# Patient Record
Sex: MALE | Race: WHITE | NOT HISPANIC OR LATINO | URBAN - METROPOLITAN AREA
[De-identification: names, ages, dates, MRNs, and addresses within clinical notes are randomized per-mention and may not be internally consistent; named-entity substitution may affect disease eponyms.]

---

## 2017-05-11 ENCOUNTER — OUTPATIENT (OUTPATIENT)
Dept: OUTPATIENT SERVICES | Facility: HOSPITAL | Age: 82
LOS: 1 days | Discharge: HOME | End: 2017-05-11

## 2017-06-06 ENCOUNTER — INPATIENT (INPATIENT)
Facility: HOSPITAL | Age: 82
LOS: 2 days | Discharge: HOME | End: 2017-06-09
Attending: INTERNAL MEDICINE

## 2017-06-06 DIAGNOSIS — I10 ESSENTIAL (PRIMARY) HYPERTENSION: ICD-10-CM

## 2017-06-06 DIAGNOSIS — I73.9 PERIPHERAL VASCULAR DISEASE, UNSPECIFIED: ICD-10-CM

## 2017-06-06 DIAGNOSIS — J44.1 CHRONIC OBSTRUCTIVE PULMONARY DISEASE WITH (ACUTE) EXACERBATION: ICD-10-CM

## 2017-06-06 DIAGNOSIS — J44.0 CHRONIC OBSTRUCTIVE PULMONARY DISEASE WITH (ACUTE) LOWER RESPIRATORY INFECTION: ICD-10-CM

## 2017-06-06 DIAGNOSIS — E78.5 HYPERLIPIDEMIA, UNSPECIFIED: ICD-10-CM

## 2017-06-06 DIAGNOSIS — I71.4 ABDOMINAL AORTIC ANEURYSM, WITHOUT RUPTURE: ICD-10-CM

## 2017-06-23 PROBLEM — Z00.00 ENCOUNTER FOR PREVENTIVE HEALTH EXAMINATION: Status: ACTIVE | Noted: 2017-06-23

## 2017-06-28 DIAGNOSIS — J44.1 CHRONIC OBSTRUCTIVE PULMONARY DISEASE WITH (ACUTE) EXACERBATION: ICD-10-CM

## 2017-06-28 DIAGNOSIS — I10 ESSENTIAL (PRIMARY) HYPERTENSION: ICD-10-CM

## 2017-06-28 DIAGNOSIS — I73.9 PERIPHERAL VASCULAR DISEASE, UNSPECIFIED: ICD-10-CM

## 2017-06-28 DIAGNOSIS — F17.210 NICOTINE DEPENDENCE, CIGARETTES, UNCOMPLICATED: ICD-10-CM

## 2017-06-28 DIAGNOSIS — Z95.2 PRESENCE OF PROSTHETIC HEART VALVE: ICD-10-CM

## 2017-06-28 DIAGNOSIS — E78.00 PURE HYPERCHOLESTEROLEMIA, UNSPECIFIED: ICD-10-CM

## 2017-06-28 DIAGNOSIS — H54.42 BLINDNESS, LEFT EYE, NORMAL VISION RIGHT EYE: ICD-10-CM

## 2017-06-28 DIAGNOSIS — J96.22 ACUTE AND CHRONIC RESPIRATORY FAILURE WITH HYPERCAPNIA: ICD-10-CM

## 2017-07-18 ENCOUNTER — APPOINTMENT (OUTPATIENT)
Dept: VASCULAR SURGERY | Facility: CLINIC | Age: 82
End: 2017-07-18

## 2017-07-18 VITALS
BODY MASS INDEX: 25.66 KG/M2 | WEIGHT: 154 LBS | SYSTOLIC BLOOD PRESSURE: 124 MMHG | DIASTOLIC BLOOD PRESSURE: 78 MMHG | HEIGHT: 65 IN

## 2017-07-18 DIAGNOSIS — I73.9 PERIPHERAL VASCULAR DISEASE, UNSPECIFIED: ICD-10-CM

## 2017-07-18 DIAGNOSIS — J44.9 CHRONIC OBSTRUCTIVE PULMONARY DISEASE, UNSPECIFIED: ICD-10-CM

## 2017-07-18 DIAGNOSIS — Z78.9 OTHER SPECIFIED HEALTH STATUS: ICD-10-CM

## 2017-07-18 DIAGNOSIS — F17.200 NICOTINE DEPENDENCE, UNSPECIFIED, UNCOMPLICATED: ICD-10-CM

## 2017-07-18 DIAGNOSIS — Z86.79 PERSONAL HISTORY OF OTHER DISEASES OF THE CIRCULATORY SYSTEM: ICD-10-CM

## 2017-07-18 DIAGNOSIS — H54.42 BLINDNESS, LEFT EYE, NORMAL VISION RIGHT EYE: ICD-10-CM

## 2017-07-18 DIAGNOSIS — Z86.39 PERSONAL HISTORY OF OTHER ENDOCRINE, NUTRITIONAL AND METABOLIC DISEASE: ICD-10-CM

## 2017-07-18 RX ORDER — OLOPATADINE HYDROCHLORIDE 2 MG/ML
0.2 SOLUTION OPHTHALMIC
Refills: 0 | Status: ACTIVE | COMMUNITY

## 2017-07-18 RX ORDER — ATORVASTATIN CALCIUM 10 MG/1
10 TABLET, FILM COATED ORAL
Refills: 0 | Status: ACTIVE | COMMUNITY

## 2017-07-18 RX ORDER — HOMATROPINE HYDROBROMIDE OPHTHALMIC 50 MG/ML
5 SOLUTION OPHTHALMIC
Refills: 0 | Status: ACTIVE | COMMUNITY

## 2017-07-18 RX ORDER — BUDESONIDE 0.5 MG/2ML
0.5 INHALANT ORAL
Refills: 0 | Status: ACTIVE | COMMUNITY

## 2017-07-18 RX ORDER — STANDARDIZED SENNA CONCENTRATE 8.6 MG/1
TABLET ORAL
Refills: 0 | Status: ACTIVE | COMMUNITY

## 2017-07-18 RX ORDER — SOLIFENACIN SUCCINATE 5 MG/1
5 TABLET, FILM COATED ORAL
Refills: 0 | Status: ACTIVE | COMMUNITY

## 2017-07-18 RX ORDER — ESCITALOPRAM OXALATE 20 MG/1
20 TABLET ORAL
Refills: 0 | Status: ACTIVE | COMMUNITY

## 2017-07-18 RX ORDER — CHOLECALCIFEROL (VITAMIN D3) 125 MCG
TABLET ORAL
Refills: 0 | Status: ACTIVE | COMMUNITY

## 2017-07-18 RX ORDER — CLOPIDOGREL BISULFATE 75 MG/1
75 TABLET, FILM COATED ORAL
Refills: 0 | Status: ACTIVE | COMMUNITY

## 2017-07-18 RX ORDER — TAMSULOSIN HYDROCHLORIDE 0.4 MG/1
0.4 CAPSULE ORAL
Refills: 0 | Status: ACTIVE | COMMUNITY

## 2017-07-18 RX ORDER — FUROSEMIDE 20 MG/1
20 TABLET ORAL
Refills: 0 | Status: ACTIVE | COMMUNITY

## 2017-07-18 RX ORDER — DIFLUPREDNATE 0.5 MG/ML
0.05 EMULSION OPHTHALMIC
Refills: 0 | Status: ACTIVE | COMMUNITY

## 2017-07-18 RX ORDER — ALBUTEROL SULFATE 1.25 MG/3ML
1.25 SOLUTION RESPIRATORY (INHALATION)
Refills: 0 | Status: ACTIVE | COMMUNITY

## 2017-07-18 RX ORDER — QUETIAPINE FUMARATE 25 MG/1
25 TABLET ORAL
Refills: 0 | Status: ACTIVE | COMMUNITY

## 2017-10-26 ENCOUNTER — APPOINTMENT (OUTPATIENT)
Dept: ORTHOPEDIC SURGERY | Facility: CLINIC | Age: 82
End: 2017-10-26
Payer: MEDICARE

## 2017-10-26 VITALS — RESPIRATION RATE: 16 BRPM | BODY MASS INDEX: 23.64 KG/M2 | HEIGHT: 68 IN | WEIGHT: 156 LBS

## 2017-10-26 DIAGNOSIS — M18.12 UNILATERAL PRIMARY OSTEOARTHRITIS OF FIRST CARPOMETACARPAL JOINT, LEFT HAND: ICD-10-CM

## 2017-10-26 DIAGNOSIS — M65.332 TRIGGER FINGER, LEFT MIDDLE FINGER: ICD-10-CM

## 2017-10-26 DIAGNOSIS — M65.322 TRIGGER FINGER, LEFT INDEX FINGER: ICD-10-CM

## 2017-10-26 DIAGNOSIS — M65.312 TRIGGER THUMB, LEFT THUMB: ICD-10-CM

## 2017-10-26 DIAGNOSIS — M18.11 UNILATERAL PRIMARY OSTEOARTHRITIS OF FIRST CARPOMETACARPAL JOINT, RIGHT HAND: ICD-10-CM

## 2017-10-26 PROCEDURE — 20550 NJX 1 TENDON SHEATH/LIGAMENT: CPT | Mod: 59,LT

## 2017-10-26 PROCEDURE — 73110 X-RAY EXAM OF WRIST: CPT | Mod: 50

## 2017-10-26 PROCEDURE — 99203 OFFICE O/P NEW LOW 30 MIN: CPT | Mod: 25

## 2017-10-26 PROCEDURE — 20605 DRAIN/INJ JOINT/BURSA W/O US: CPT | Mod: 59,RT

## 2018-08-30 ENCOUNTER — OUTPATIENT (OUTPATIENT)
Dept: OUTPATIENT SERVICES | Facility: HOSPITAL | Age: 83
LOS: 1 days | Discharge: HOME | End: 2018-08-30

## 2018-08-30 DIAGNOSIS — R91.1 SOLITARY PULMONARY NODULE: ICD-10-CM

## 2018-08-30 LAB — GLUCOSE BLDC GLUCOMTR-MCNC: 135 MG/DL — HIGH (ref 70–99)

## 2018-12-06 ENCOUNTER — OUTPATIENT (OUTPATIENT)
Dept: OUTPATIENT SERVICES | Facility: HOSPITAL | Age: 83
LOS: 1 days | Discharge: HOME | End: 2018-12-06

## 2018-12-06 DIAGNOSIS — C34.12 MALIGNANT NEOPLASM OF UPPER LOBE, LEFT BRONCHUS OR LUNG: ICD-10-CM

## 2018-12-13 ENCOUNTER — EMERGENCY (EMERGENCY)
Facility: HOSPITAL | Age: 83
LOS: 0 days | Discharge: HOME | End: 2018-12-13
Attending: EMERGENCY MEDICINE | Admitting: EMERGENCY MEDICINE
Payer: MEDICARE

## 2018-12-13 VITALS
WEIGHT: 149.91 LBS | SYSTOLIC BLOOD PRESSURE: 161 MMHG | RESPIRATION RATE: 18 BRPM | TEMPERATURE: 97 F | HEART RATE: 63 BPM | HEIGHT: 67 IN | DIASTOLIC BLOOD PRESSURE: 68 MMHG | OXYGEN SATURATION: 96 %

## 2018-12-13 VITALS
DIASTOLIC BLOOD PRESSURE: 81 MMHG | HEART RATE: 65 BPM | SYSTOLIC BLOOD PRESSURE: 135 MMHG | OXYGEN SATURATION: 97 % | TEMPERATURE: 97 F | RESPIRATION RATE: 18 BRPM

## 2018-12-13 DIAGNOSIS — J44.9 CHRONIC OBSTRUCTIVE PULMONARY DISEASE, UNSPECIFIED: ICD-10-CM

## 2018-12-13 DIAGNOSIS — M25.472 EFFUSION, LEFT ANKLE: ICD-10-CM

## 2018-12-13 DIAGNOSIS — M79.662 PAIN IN LEFT LOWER LEG: ICD-10-CM

## 2018-12-13 DIAGNOSIS — M79.669 PAIN IN UNSPECIFIED LOWER LEG: ICD-10-CM

## 2018-12-13 DIAGNOSIS — I10 ESSENTIAL (PRIMARY) HYPERTENSION: ICD-10-CM

## 2018-12-13 DIAGNOSIS — F17.200 NICOTINE DEPENDENCE, UNSPECIFIED, UNCOMPLICATED: ICD-10-CM

## 2018-12-13 DIAGNOSIS — M25.572 PAIN IN LEFT ANKLE AND JOINTS OF LEFT FOOT: ICD-10-CM

## 2018-12-13 DIAGNOSIS — Z85.118 PERSONAL HISTORY OF OTHER MALIGNANT NEOPLASM OF BRONCHUS AND LUNG: ICD-10-CM

## 2018-12-13 PROCEDURE — 93970 EXTREMITY STUDY: CPT | Mod: 26

## 2018-12-13 NOTE — ED PROVIDER NOTE - OBJECTIVE STATEMENT
87 year old male with hx of Lung ca just finished radiation therapy 1 week ago, COPD, HTN, AVR not on AC sent in  by pulmonologist Dr. Aponte to rule out DVT. Pt has noticed worsening leg swelling over his left ankle for the last few days. + current smoker. Denies hx of DVT, recent sx, travel, chest pain, sob, trauma, hx of CHF.

## 2018-12-13 NOTE — ED PROVIDER NOTE - ATTENDING CONTRIBUTION TO CARE
Pt presenting with unilateral pedal edema and pain, no cellulitis no calf pain, no respiratory symptoms, no chest pain.   R/O DVT, no evidence of arterial insufficiency 1) check lower extremity US to eval for possible DVT; d/w pt that repeat US will be needed in one week if US is normal here 2) Reassess, pain control if necessary

## 2018-12-13 NOTE — ED ADULT NURSE NOTE - NSIMPLEMENTINTERV_GEN_ALL_ED
Implemented All Universal Safety Interventions:  [place] to call system. Call bell, personal items and telephone within reach. Instruct patient to call for assistance. Room bathroom lighting operational. Non-slip footwear when patient is off stretcher. Physically safe environment: no spills, clutter or unnecessary equipment. Stretcher in lowest position, wheels locked, appropriate side rails in place.

## 2018-12-13 NOTE — ED PROVIDER NOTE - NS ED ROS FT
Constitutional: no fever, chills, no recent weight loss, change in appetite or malaise  Cardiac: No chest pain, SOB or edema.  Respiratory: No cough or respiratory distress  GI: No nausea, vomiting, diarrhea or abdominal pain.  : No dysuria, frequency, urgency or hematuria  MS: no pain to back or extremities, no loss of ROM, no weakness  Extrem: + left ankle edema  Neuro: No headache or weakness. No LOC.  Skin: No skin rash/skin changes  Except as documented in the HPI, all other systems are negative.

## 2018-12-13 NOTE — ED PROVIDER NOTE - PHYSICAL EXAMINATION
CONSTITUTIONAL: Well-appearing; well-nourished; in no apparent distress.   NECK: Supple; non-tender; no cervical lymphadenopathy. No JVD.   CARDIOVASCULAR: Normal S1, S2; no murmurs, rubs, or gallops.   RESPIRATORY: Normal chest excursion with respiration; breath sounds clear and equal bilaterally; no wheezes, rhonchi, or rales.  GI/: Normal bowel sounds; non-distended; non-tender; no palpable organomegaly.   MS: No evidence of trauma or deformity. Non-tender to palpation. Normal ROM in all four extremities; non-tender to palpation  Extrem: + non pitting edema over left ankle. + pedal pulses intact. No calf tenderness  SKIN: Normal for age and race; No skin changes, erythema, warmth  NEURO/PSYCH: A & O x 4; grossly unremarkable. mood and manner are appropriate.

## 2019-04-11 ENCOUNTER — INPATIENT (INPATIENT)
Facility: HOSPITAL | Age: 84
LOS: 3 days | Discharge: NURSING, REHAB OR LONG-TERM CARE | End: 2019-04-15
Attending: INTERNAL MEDICINE | Admitting: INTERNAL MEDICINE
Payer: MEDICARE

## 2019-04-11 VITALS
OXYGEN SATURATION: 94 % | SYSTOLIC BLOOD PRESSURE: 156 MMHG | RESPIRATION RATE: 21 BRPM | TEMPERATURE: 98 F | DIASTOLIC BLOOD PRESSURE: 65 MMHG | HEART RATE: 78 BPM

## 2019-04-11 DIAGNOSIS — E46 UNSPECIFIED PROTEIN-CALORIE MALNUTRITION: ICD-10-CM

## 2019-04-11 DIAGNOSIS — R29.6 REPEATED FALLS: ICD-10-CM

## 2019-04-11 DIAGNOSIS — C34.90 MALIGNANT NEOPLASM OF UNSPECIFIED PART OF UNSPECIFIED BRONCHUS OR LUNG: ICD-10-CM

## 2019-04-11 DIAGNOSIS — J44.9 CHRONIC OBSTRUCTIVE PULMONARY DISEASE, UNSPECIFIED: ICD-10-CM

## 2019-04-11 DIAGNOSIS — R42 DIZZINESS AND GIDDINESS: ICD-10-CM

## 2019-04-11 DIAGNOSIS — Z79.899 OTHER LONG TERM (CURRENT) DRUG THERAPY: ICD-10-CM

## 2019-04-11 DIAGNOSIS — S20.229A CONTUSION OF UNSPECIFIED BACK WALL OF THORAX, INITIAL ENCOUNTER: ICD-10-CM

## 2019-04-11 LAB
ALBUMIN SERPL ELPH-MCNC: 3.4 G/DL — LOW (ref 3.5–5.2)
ALLERGY+IMMUNOLOGY DIAG STUDY NOTE: SIGNIFICANT CHANGE UP
ALP SERPL-CCNC: 85 U/L — SIGNIFICANT CHANGE UP (ref 30–115)
ALT FLD-CCNC: 12 U/L — SIGNIFICANT CHANGE UP (ref 0–41)
ANION GAP SERPL CALC-SCNC: 9 MMOL/L — SIGNIFICANT CHANGE UP (ref 7–14)
APPEARANCE UR: CLEAR — SIGNIFICANT CHANGE UP
APTT BLD: 27.6 SEC — SIGNIFICANT CHANGE UP (ref 27–39.2)
AST SERPL-CCNC: 14 U/L — SIGNIFICANT CHANGE UP (ref 0–41)
BASE EXCESS BLDV CALC-SCNC: 9.1 MMOL/L — HIGH (ref -2–2)
BASOPHILS # BLD AUTO: 0.02 K/UL — SIGNIFICANT CHANGE UP (ref 0–0.2)
BASOPHILS NFR BLD AUTO: 0.2 % — SIGNIFICANT CHANGE UP (ref 0–1)
BILIRUB SERPL-MCNC: 0.9 MG/DL — SIGNIFICANT CHANGE UP (ref 0.2–1.2)
BILIRUB UR-MCNC: NEGATIVE — SIGNIFICANT CHANGE UP
BUN SERPL-MCNC: 13 MG/DL — SIGNIFICANT CHANGE UP (ref 10–20)
CA-I SERPL-SCNC: 1.11 MMOL/L — LOW (ref 1.12–1.3)
CALCIUM SERPL-MCNC: 8.7 MG/DL — SIGNIFICANT CHANGE UP (ref 8.5–10.1)
CHLORIDE SERPL-SCNC: 95 MMOL/L — LOW (ref 98–110)
CK SERPL-CCNC: 69 U/L — SIGNIFICANT CHANGE UP (ref 0–225)
CO2 SERPL-SCNC: 33 MMOL/L — HIGH (ref 17–32)
COLOR SPEC: YELLOW — SIGNIFICANT CHANGE UP
CREAT SERPL-MCNC: 0.8 MG/DL — SIGNIFICANT CHANGE UP (ref 0.7–1.5)
DIFF PNL FLD: NEGATIVE — SIGNIFICANT CHANGE UP
EOSINOPHIL # BLD AUTO: 0.07 K/UL — SIGNIFICANT CHANGE UP (ref 0–0.7)
EOSINOPHIL NFR BLD AUTO: 0.8 % — SIGNIFICANT CHANGE UP (ref 0–8)
GAS PNL BLDV: 136 MMOL/L — SIGNIFICANT CHANGE UP (ref 136–145)
GAS PNL BLDV: SIGNIFICANT CHANGE UP
GLUCOSE SERPL-MCNC: 183 MG/DL — HIGH (ref 70–99)
GLUCOSE UR QL: NEGATIVE MG/DL — SIGNIFICANT CHANGE UP
HCO3 BLDV-SCNC: 36 MMOL/L — HIGH (ref 22–29)
HCT VFR BLD CALC: 38.2 % — LOW (ref 42–52)
HCT VFR BLDA CALC: 41.6 % — SIGNIFICANT CHANGE UP (ref 34–44)
HGB BLD CALC-MCNC: 13.6 G/DL — LOW (ref 14–18)
HGB BLD-MCNC: 12.5 G/DL — LOW (ref 14–18)
HOROWITZ INDEX BLDV+IHG-RTO: 21 — SIGNIFICANT CHANGE UP
IMM GRANULOCYTES NFR BLD AUTO: 0.9 % — HIGH (ref 0.1–0.3)
INR BLD: 0.99 RATIO — SIGNIFICANT CHANGE UP (ref 0.65–1.3)
KETONES UR-MCNC: NEGATIVE — SIGNIFICANT CHANGE UP
LACTATE BLDV-MCNC: 2.3 MMOL/L — HIGH (ref 0.5–1.6)
LACTATE SERPL-SCNC: 2.2 MMOL/L — SIGNIFICANT CHANGE UP (ref 0.5–2.2)
LEUKOCYTE ESTERASE UR-ACNC: NEGATIVE — SIGNIFICANT CHANGE UP
LIDOCAIN IGE QN: 17 U/L — SIGNIFICANT CHANGE UP (ref 7–60)
LYMPHOCYTES # BLD AUTO: 1.22 K/UL — SIGNIFICANT CHANGE UP (ref 1.2–3.4)
LYMPHOCYTES # BLD AUTO: 13.7 % — LOW (ref 20.5–51.1)
MCHC RBC-ENTMCNC: 32.7 G/DL — SIGNIFICANT CHANGE UP (ref 32–37)
MCHC RBC-ENTMCNC: 33.2 PG — HIGH (ref 27–31)
MCV RBC AUTO: 101.6 FL — HIGH (ref 80–94)
MONOCYTES # BLD AUTO: 0.58 K/UL — SIGNIFICANT CHANGE UP (ref 0.1–0.6)
MONOCYTES NFR BLD AUTO: 6.5 % — SIGNIFICANT CHANGE UP (ref 1.7–9.3)
NEUTROPHILS # BLD AUTO: 6.96 K/UL — HIGH (ref 1.4–6.5)
NEUTROPHILS NFR BLD AUTO: 77.9 % — HIGH (ref 42.2–75.2)
NITRITE UR-MCNC: NEGATIVE — SIGNIFICANT CHANGE UP
NRBC # BLD: 0 /100 WBCS — SIGNIFICANT CHANGE UP (ref 0–0)
PCO2 BLDV: 60 MMHG — HIGH (ref 41–51)
PH BLDV: 7.39 — SIGNIFICANT CHANGE UP (ref 7.26–7.43)
PH UR: 8 — SIGNIFICANT CHANGE UP (ref 5–8)
PLATELET # BLD AUTO: 190 K/UL — SIGNIFICANT CHANGE UP (ref 130–400)
PO2 BLDV: 26 MMHG — SIGNIFICANT CHANGE UP (ref 20–40)
POTASSIUM BLDV-SCNC: 3.8 MMOL/L — SIGNIFICANT CHANGE UP (ref 3.3–5.6)
POTASSIUM SERPL-MCNC: 4.1 MMOL/L — SIGNIFICANT CHANGE UP (ref 3.5–5)
POTASSIUM SERPL-SCNC: 4.1 MMOL/L — SIGNIFICANT CHANGE UP (ref 3.5–5)
PROT SERPL-MCNC: 5.4 G/DL — LOW (ref 6–8)
PROT UR-MCNC: NEGATIVE MG/DL — SIGNIFICANT CHANGE UP
PROTHROM AB SERPL-ACNC: 11.4 SEC — SIGNIFICANT CHANGE UP (ref 9.95–12.87)
RBC # BLD: 3.76 M/UL — LOW (ref 4.7–6.1)
RBC # FLD: 15.6 % — HIGH (ref 11.5–14.5)
SAO2 % BLDV: 44 % — SIGNIFICANT CHANGE UP
SODIUM SERPL-SCNC: 137 MMOL/L — SIGNIFICANT CHANGE UP (ref 135–146)
SP GR SPEC: 1.01 — SIGNIFICANT CHANGE UP (ref 1.01–1.03)
TROPONIN T SERPL-MCNC: <0.01 NG/ML — SIGNIFICANT CHANGE UP
TYPE + AB SCN PNL BLD: SIGNIFICANT CHANGE UP
UROBILINOGEN FLD QL: >=8 MG/DL (ref 0.2–0.2)
WBC # BLD: 8.93 K/UL — SIGNIFICANT CHANGE UP (ref 4.8–10.8)
WBC # FLD AUTO: 8.93 K/UL — SIGNIFICANT CHANGE UP (ref 4.8–10.8)

## 2019-04-11 PROCEDURE — 99285 EMERGENCY DEPT VISIT HI MDM: CPT | Mod: 25

## 2019-04-11 PROCEDURE — 73590 X-RAY EXAM OF LOWER LEG: CPT | Mod: 26,LT

## 2019-04-11 PROCEDURE — 93308 TTE F-UP OR LMTD: CPT | Mod: 26

## 2019-04-11 PROCEDURE — 76604 US EXAM CHEST: CPT | Mod: 26

## 2019-04-11 PROCEDURE — 70450 CT HEAD/BRAIN W/O DYE: CPT | Mod: 26

## 2019-04-11 PROCEDURE — 76705 ECHO EXAM OF ABDOMEN: CPT | Mod: 26

## 2019-04-11 PROCEDURE — 73552 X-RAY EXAM OF FEMUR 2/>: CPT | Mod: 26,LT

## 2019-04-11 PROCEDURE — 73610 X-RAY EXAM OF ANKLE: CPT | Mod: 26,LT

## 2019-04-11 PROCEDURE — 71260 CT THORAX DX C+: CPT | Mod: 26

## 2019-04-11 PROCEDURE — 74177 CT ABD & PELVIS W/CONTRAST: CPT | Mod: 26

## 2019-04-11 PROCEDURE — 72125 CT NECK SPINE W/O DYE: CPT | Mod: 26

## 2019-04-11 RX ORDER — QUETIAPINE FUMARATE 200 MG/1
25 TABLET, FILM COATED ORAL AT BEDTIME
Qty: 0 | Refills: 0 | Status: DISCONTINUED | OUTPATIENT
Start: 2019-04-11 | End: 2019-04-15

## 2019-04-11 RX ORDER — ASPIRIN/CALCIUM CARB/MAGNESIUM 324 MG
81 TABLET ORAL DAILY
Qty: 0 | Refills: 0 | Status: DISCONTINUED | OUTPATIENT
Start: 2019-04-11 | End: 2019-04-15

## 2019-04-11 RX ORDER — BUDESONIDE, MICRONIZED 100 %
2 POWDER (GRAM) MISCELLANEOUS
Qty: 0 | Refills: 0 | COMMUNITY

## 2019-04-11 RX ORDER — ROFLUMILAST 500 UG/1
1 TABLET ORAL
Qty: 0 | Refills: 0 | COMMUNITY

## 2019-04-11 RX ORDER — FERROUS SULFATE 325(65) MG
0 TABLET ORAL
Qty: 0 | Refills: 0 | COMMUNITY

## 2019-04-11 RX ORDER — FERROUS SULFATE 325(65) MG
325 TABLET ORAL DAILY
Qty: 0 | Refills: 0 | Status: DISCONTINUED | OUTPATIENT
Start: 2019-04-11 | End: 2019-04-15

## 2019-04-11 RX ORDER — ATORVASTATIN CALCIUM 80 MG/1
10 TABLET, FILM COATED ORAL AT BEDTIME
Qty: 0 | Refills: 0 | Status: DISCONTINUED | OUTPATIENT
Start: 2019-04-11 | End: 2019-04-15

## 2019-04-11 RX ORDER — QUETIAPINE FUMARATE 200 MG/1
1 TABLET, FILM COATED ORAL
Qty: 0 | Refills: 0 | COMMUNITY

## 2019-04-11 RX ORDER — OXYBUTYNIN CHLORIDE 5 MG
10 TABLET ORAL
Qty: 0 | Refills: 0 | Status: DISCONTINUED | OUTPATIENT
Start: 2019-04-11 | End: 2019-04-15

## 2019-04-11 RX ORDER — IPRATROPIUM BROMIDE 0.2 MG/ML
500 SOLUTION, NON-ORAL INHALATION
Qty: 0 | Refills: 0 | Status: DISCONTINUED | OUTPATIENT
Start: 2019-04-11 | End: 2019-04-13

## 2019-04-11 RX ORDER — SOLIFENACIN SUCCINATE 10 MG/1
1 TABLET ORAL
Qty: 0 | Refills: 0 | COMMUNITY

## 2019-04-11 RX ORDER — DOCUSATE SODIUM 100 MG
200 CAPSULE ORAL AT BEDTIME
Qty: 0 | Refills: 0 | Status: DISCONTINUED | OUTPATIENT
Start: 2019-04-11 | End: 2019-04-15

## 2019-04-11 RX ORDER — ESCITALOPRAM OXALATE 10 MG/1
20 TABLET, FILM COATED ORAL DAILY
Qty: 0 | Refills: 0 | Status: DISCONTINUED | OUTPATIENT
Start: 2019-04-11 | End: 2019-04-15

## 2019-04-11 RX ORDER — ALBUTEROL 90 UG/1
1 AEROSOL, METERED ORAL EVERY 4 HOURS
Qty: 0 | Refills: 0 | Status: DISCONTINUED | OUTPATIENT
Start: 2019-04-11 | End: 2019-04-13

## 2019-04-11 RX ORDER — SODIUM CHLORIDE 9 MG/ML
1000 INJECTION INTRAMUSCULAR; INTRAVENOUS; SUBCUTANEOUS ONCE
Qty: 0 | Refills: 0 | Status: COMPLETED | OUTPATIENT
Start: 2019-04-11 | End: 2019-04-11

## 2019-04-11 RX ORDER — ALPRAZOLAM 0.25 MG
0.5 TABLET ORAL DAILY
Qty: 0 | Refills: 0 | Status: DISCONTINUED | OUTPATIENT
Start: 2019-04-11 | End: 2019-04-15

## 2019-04-11 RX ORDER — METOPROLOL TARTRATE 50 MG
1 TABLET ORAL
Qty: 0 | Refills: 0 | COMMUNITY

## 2019-04-11 RX ORDER — TAMSULOSIN HYDROCHLORIDE 0.4 MG/1
1 CAPSULE ORAL
Qty: 0 | Refills: 0 | COMMUNITY

## 2019-04-11 RX ORDER — BUDESONIDE, MICRONIZED 100 %
0.5 POWDER (GRAM) MISCELLANEOUS EVERY 12 HOURS
Qty: 0 | Refills: 0 | Status: DISCONTINUED | OUTPATIENT
Start: 2019-04-11 | End: 2019-04-15

## 2019-04-11 RX ORDER — DOCUSATE SODIUM 100 MG
2 CAPSULE ORAL
Qty: 0 | Refills: 0 | COMMUNITY

## 2019-04-11 RX ORDER — IPRATROPIUM/ALBUTEROL SULFATE 18-103MCG
3 AEROSOL WITH ADAPTER (GRAM) INHALATION ONCE
Qty: 0 | Refills: 0 | Status: COMPLETED | OUTPATIENT
Start: 2019-04-11 | End: 2019-04-11

## 2019-04-11 RX ORDER — ESCITALOPRAM OXALATE 10 MG/1
1 TABLET, FILM COATED ORAL
Qty: 0 | Refills: 0 | COMMUNITY

## 2019-04-11 RX ORDER — ASPIRIN/CALCIUM CARB/MAGNESIUM 324 MG
1 TABLET ORAL
Qty: 0 | Refills: 0 | COMMUNITY

## 2019-04-11 RX ORDER — HEPARIN SODIUM 5000 [USP'U]/ML
5000 INJECTION INTRAVENOUS; SUBCUTANEOUS EVERY 12 HOURS
Qty: 0 | Refills: 0 | Status: DISCONTINUED | OUTPATIENT
Start: 2019-04-12 | End: 2019-04-15

## 2019-04-11 RX ORDER — FUROSEMIDE 40 MG
20 TABLET ORAL DAILY
Qty: 0 | Refills: 0 | Status: DISCONTINUED | OUTPATIENT
Start: 2019-04-11 | End: 2019-04-15

## 2019-04-11 RX ORDER — OLOPATADINE HYDROCHLORIDE 1 MG/ML
1 SOLUTION/ DROPS OPHTHALMIC
Qty: 0 | Refills: 0 | COMMUNITY

## 2019-04-11 RX ORDER — ATORVASTATIN CALCIUM 80 MG/1
1 TABLET, FILM COATED ORAL
Qty: 0 | Refills: 0 | COMMUNITY

## 2019-04-11 RX ORDER — TRAZODONE HCL 50 MG
50 TABLET ORAL AT BEDTIME
Qty: 0 | Refills: 0 | Status: DISCONTINUED | OUTPATIENT
Start: 2019-04-11 | End: 2019-04-15

## 2019-04-11 RX ORDER — DUREZOL 0.5 MG/ML
1 EMULSION OPHTHALMIC
Qty: 0 | Refills: 0 | COMMUNITY

## 2019-04-11 RX ORDER — MORPHINE SULFATE 50 MG/1
4 CAPSULE, EXTENDED RELEASE ORAL ONCE
Qty: 0 | Refills: 0 | Status: DISCONTINUED | OUTPATIENT
Start: 2019-04-11 | End: 2019-04-11

## 2019-04-11 RX ORDER — FUROSEMIDE 40 MG
1 TABLET ORAL
Qty: 0 | Refills: 0 | COMMUNITY

## 2019-04-11 RX ORDER — TAMSULOSIN HYDROCHLORIDE 0.4 MG/1
0.4 CAPSULE ORAL AT BEDTIME
Qty: 0 | Refills: 0 | Status: DISCONTINUED | OUTPATIENT
Start: 2019-04-11 | End: 2019-04-15

## 2019-04-11 RX ADMIN — ATORVASTATIN CALCIUM 10 MILLIGRAM(S): 80 TABLET, FILM COATED ORAL at 22:53

## 2019-04-11 RX ADMIN — MORPHINE SULFATE 4 MILLIGRAM(S): 50 CAPSULE, EXTENDED RELEASE ORAL at 19:36

## 2019-04-11 RX ADMIN — Medication 3 MILLILITER(S): at 14:00

## 2019-04-11 RX ADMIN — SODIUM CHLORIDE 1000 MILLILITER(S): 9 INJECTION INTRAMUSCULAR; INTRAVENOUS; SUBCUTANEOUS at 15:34

## 2019-04-11 RX ADMIN — Medication 200 MILLIGRAM(S): at 22:53

## 2019-04-11 RX ADMIN — Medication 50 MILLIGRAM(S): at 22:53

## 2019-04-11 RX ADMIN — QUETIAPINE FUMARATE 25 MILLIGRAM(S): 200 TABLET, FILM COATED ORAL at 22:54

## 2019-04-11 RX ADMIN — Medication 0.5 MILLIGRAM(S): at 22:54

## 2019-04-11 RX ADMIN — TAMSULOSIN HYDROCHLORIDE 0.4 MILLIGRAM(S): 0.4 CAPSULE ORAL at 22:53

## 2019-04-11 NOTE — H&P ADULT - HISTORY OF PRESENT ILLNESS
[age]yo male with several chronic conditions including COPD and lung cancer is sent to the ER due to multiple falls over the course of 1 week associated with dizziness. 88yo male with several chronic conditions including COPD and lung cancer is sent to the ER due to multiple falls over the course of 1 week associated with weakness and unsteady gait. Currently no pain. No modifying factors identified. (Family at bedside to assist with history) 88yo male with several chronic conditions including COPD and lung cancer is sent to the ER due to multiple falls over the course of 1 week associated with weakness and unsteady gait. Currently no pain. No modifying factors identified.

## 2019-04-11 NOTE — ED ADULT NURSE NOTE - NSIMPLEMENTINTERV_GEN_ALL_ED
Implemented All Fall with Harm Risk Interventions:  [place] to call system. Call bell, personal items and telephone within reach. Instruct patient to call for assistance. Room bathroom lighting operational. Non-slip footwear when patient is off stretcher. Physically safe environment: no spills, clutter or unnecessary equipment. Stretcher in lowest position, wheels locked, appropriate side rails in place. Provide visual cue, wrist band, yellow gown, etc. Monitor gait and stability. Monitor for mental status changes and reorient to person, place, and time. Review medications for side effects contributing to fall risk. Reinforce activity limits and safety measures with patient and family. Provide visual clues: red socks.

## 2019-04-11 NOTE — H&P ADULT - PROBLEM SELECTOR PLAN 1
For now monitor on telemetry to assess for arrythmia along with orthostatics. However may need neuro to see if not cardiac but also consider medication effect (on several sedating meds)

## 2019-04-11 NOTE — ED PROVIDER NOTE - OBJECTIVE STATEMENT
88 y/o male s/p fall x multiple episodes since Friday. patient last fall today in kitchen. patient on daily asa. patient with unsteady gait and increasing weakness since fall. patient with hx of COPD on home O2, lung cancer last ratx 2018, HTN, ESRD, AAA repair , AVR, left eye blindness. patient with large amount of bruising to left lower back and left buttock and leg. patient c/o upper left rib pain with lower back pain. patient denies any abdominal pain. patient with swelling to left leg. patient denies any neck pain, headache or tingling to upper extremities. patient appears uncomfortable on initial evaluation.

## 2019-04-11 NOTE — ED PROVIDER NOTE - PHYSICAL EXAMINATION
skin: large amount of bruising to lower back and buttock extending to left lower leg with swelling   +multiple abrasions, upper and lower extremiteis    msk: +pelvic stability with good rom at hips  left ankle +sts with tenderness laterally

## 2019-04-11 NOTE — ED PROVIDER NOTE - CLINICAL SUMMARY MEDICAL DECISION MAKING FREE TEXT BOX
Pt with several recent falls, eventually able to get pt to ED today, PE as above, pt panscanned given extensive ecchymosis, no acute injury, will admit for further eval, stable for lr tele

## 2019-04-11 NOTE — CHART NOTE - NSCHARTNOTEFT_GEN_A_CORE
Despite medication list given to me by family, they verbalized the following adjustments, Prednisone is at 15 mg daily, and patient uses xanax 0.5 mg prn and Trazadone 50 mg at night.

## 2019-04-11 NOTE — ED PROVIDER NOTE - CARE PLAN
Principal Discharge DX:	Dizziness  Secondary Diagnosis:	Frequent falls  Secondary Diagnosis:	Lower back pain  Secondary Diagnosis:	Contusion of back

## 2019-04-11 NOTE — ED PROVIDER NOTE - PROGRESS NOTE DETAILS
patient with trauma, will proceed to CT without waiting for labs; CT available, CXR not at the moment, bedside ultrasound negative for large pneumothorax, will scan

## 2019-04-11 NOTE — ED PROVIDER NOTE - NS ED ROS FT
Review of Systems    Constitutional: (-) fever/ chills +weakness   Eyes/ENT: (-) blurry vision, (-) epistaxis (-) sore throat (-) ear pain  Cardiovascular: (-) chest pain, (-) syncope (-) palpitations  Respiratory: (-) cough, (-) shortness of breath  Gastrointestinal: (-) vomiting, (-) diarrhea (-) abdominal pain  Musculoskeletal: (-) neck pain, (+) back pain, (+)left hip pain  (+)left leg swelling  Integumentary: (-) rash, (-) swelling (+)bruising to lower back and leg   Neurological: (-) headache, (-) altered mental status (+)dizziness   Psychiatric: (-) hallucinations or depression   Allergic/Immunologic: (-) pruritus

## 2019-04-12 LAB
ALBUMIN SERPL ELPH-MCNC: 2.9 G/DL — LOW (ref 3.5–5.2)
ALP SERPL-CCNC: 81 U/L — SIGNIFICANT CHANGE UP (ref 30–115)
ALT FLD-CCNC: 13 U/L — SIGNIFICANT CHANGE UP (ref 0–41)
ANION GAP SERPL CALC-SCNC: 8 MMOL/L — SIGNIFICANT CHANGE UP (ref 7–14)
AST SERPL-CCNC: 14 U/L — SIGNIFICANT CHANGE UP (ref 0–41)
BILIRUB SERPL-MCNC: 1.2 MG/DL — SIGNIFICANT CHANGE UP (ref 0.2–1.2)
BUN SERPL-MCNC: 13 MG/DL — SIGNIFICANT CHANGE UP (ref 10–20)
CALCIUM SERPL-MCNC: 8 MG/DL — LOW (ref 8.5–10.1)
CHLORIDE SERPL-SCNC: 99 MMOL/L — SIGNIFICANT CHANGE UP (ref 98–110)
CO2 SERPL-SCNC: 33 MMOL/L — HIGH (ref 17–32)
CREAT SERPL-MCNC: 0.7 MG/DL — SIGNIFICANT CHANGE UP (ref 0.7–1.5)
GLUCOSE SERPL-MCNC: 130 MG/DL — HIGH (ref 70–99)
HCT VFR BLD CALC: 37.7 % — LOW (ref 42–52)
HGB BLD-MCNC: 12 G/DL — LOW (ref 14–18)
MCHC RBC-ENTMCNC: 31.8 G/DL — LOW (ref 32–37)
MCHC RBC-ENTMCNC: 32 PG — HIGH (ref 27–31)
MCV RBC AUTO: 100.5 FL — HIGH (ref 80–94)
NRBC # BLD: 0 /100 WBCS — SIGNIFICANT CHANGE UP (ref 0–0)
PLATELET # BLD AUTO: 173 K/UL — SIGNIFICANT CHANGE UP (ref 130–400)
POTASSIUM SERPL-MCNC: 3.8 MMOL/L — SIGNIFICANT CHANGE UP (ref 3.5–5)
POTASSIUM SERPL-SCNC: 3.8 MMOL/L — SIGNIFICANT CHANGE UP (ref 3.5–5)
PROT SERPL-MCNC: 4.6 G/DL — LOW (ref 6–8)
RBC # BLD: 3.75 M/UL — LOW (ref 4.7–6.1)
RBC # FLD: 15.9 % — HIGH (ref 11.5–14.5)
SODIUM SERPL-SCNC: 140 MMOL/L — SIGNIFICANT CHANGE UP (ref 135–146)
WBC # BLD: 9.05 K/UL — SIGNIFICANT CHANGE UP (ref 4.8–10.8)
WBC # FLD AUTO: 9.05 K/UL — SIGNIFICANT CHANGE UP (ref 4.8–10.8)

## 2019-04-12 RX ORDER — OXYCODONE AND ACETAMINOPHEN 5; 325 MG/1; MG/1
1 TABLET ORAL ONCE
Qty: 0 | Refills: 0 | Status: DISCONTINUED | OUTPATIENT
Start: 2019-04-12 | End: 2019-04-12

## 2019-04-12 RX ORDER — NICOTINE POLACRILEX 2 MG
1 GUM BUCCAL DAILY
Qty: 0 | Refills: 0 | Status: DISCONTINUED | OUTPATIENT
Start: 2019-04-12 | End: 2019-04-15

## 2019-04-12 RX ADMIN — Medication 500 MICROGRAM(S): at 08:04

## 2019-04-12 RX ADMIN — TAMSULOSIN HYDROCHLORIDE 0.4 MILLIGRAM(S): 0.4 CAPSULE ORAL at 22:42

## 2019-04-12 RX ADMIN — HEPARIN SODIUM 5000 UNIT(S): 5000 INJECTION INTRAVENOUS; SUBCUTANEOUS at 06:00

## 2019-04-12 RX ADMIN — Medication 500 MICROGRAM(S): at 20:19

## 2019-04-12 RX ADMIN — Medication 0.5 MILLIGRAM(S): at 21:47

## 2019-04-12 RX ADMIN — OXYCODONE AND ACETAMINOPHEN 1 TABLET(S): 5; 325 TABLET ORAL at 19:45

## 2019-04-12 RX ADMIN — Medication 10 MILLIGRAM(S): at 18:25

## 2019-04-12 RX ADMIN — HEPARIN SODIUM 5000 UNIT(S): 5000 INJECTION INTRAVENOUS; SUBCUTANEOUS at 18:25

## 2019-04-12 RX ADMIN — Medication 200 MILLIGRAM(S): at 22:42

## 2019-04-12 RX ADMIN — QUETIAPINE FUMARATE 25 MILLIGRAM(S): 200 TABLET, FILM COATED ORAL at 22:42

## 2019-04-12 RX ADMIN — OXYCODONE AND ACETAMINOPHEN 1 TABLET(S): 5; 325 TABLET ORAL at 18:58

## 2019-04-12 RX ADMIN — Medication 325 MILLIGRAM(S): at 12:52

## 2019-04-12 RX ADMIN — Medication 50 MILLIGRAM(S): at 22:42

## 2019-04-12 RX ADMIN — Medication 15 MILLIGRAM(S): at 05:59

## 2019-04-12 RX ADMIN — Medication 81 MILLIGRAM(S): at 12:53

## 2019-04-12 RX ADMIN — Medication 20 MILLIGRAM(S): at 06:00

## 2019-04-12 RX ADMIN — ATORVASTATIN CALCIUM 10 MILLIGRAM(S): 80 TABLET, FILM COATED ORAL at 22:43

## 2019-04-12 RX ADMIN — ESCITALOPRAM OXALATE 20 MILLIGRAM(S): 10 TABLET, FILM COATED ORAL at 12:52

## 2019-04-12 RX ADMIN — Medication 10 MILLIGRAM(S): at 06:00

## 2019-04-12 NOTE — PHYSICAL THERAPY INITIAL EVALUATION ADULT - GENERAL OBSERVATIONS, REHAB EVAL
10:25 - 10:45. Chart reviewed. Patient available to be seen for physical therapy, confirmed with nurse. Patient encountered semi-reclined in bed, +O2 via nasal cannula.  Patient would like to get OOB to chair with PT.

## 2019-04-12 NOTE — PHYSICAL THERAPY INITIAL EVALUATION ADULT - GAIT DEVIATIONS NOTED, PT EVAL
decreased weight-shifting ability/decreased thierno/increased time in double stance/decreased step length

## 2019-04-12 NOTE — PROGRESS NOTE ADULT - SUBJECTIVE AND OBJECTIVE BOX
Progress Note:  Provider Speciality                            Hospitalist      ERUMTRENT RODRIGUEZ [id number] 87y Male     CHIEF PRESENTING COMPLAINT:  Patient is a 87y old  Male who presents with a chief complaint of Falls with dizziness (2019 19:57)        SUBJECTIVE:  Patient was seen and examined at bedside.  . No significant overnight events reported.     HISTORY OF PRESENTING ILLNESS:  HPI:  (Family at bedside to assist with history) 88yo male with several chronic conditions including COPD and lung cancer is sent to the ER due to multiple falls over the course of 1 week associated with weakness and unsteady gait. Currently no pain. No modifying factors identified. (2019 19:57)      PAST MEDICAL & SURGICAL HISTORY:  PAST MEDICAL & SURGICAL HISTORY:  Blind one eye  Abdominal aneurysm  Lung cancer  ESRD (end stage renal disease)  COPD (chronic obstructive pulmonary disease)  No significant past surgical history      VITAL SIGNS:  Vital Signs Last 24 Hrs  T(C): 36.7 (2019 14:47), Max: 37.2 (2019 20:46)  T(F): 98.1 (2019 14:47), Max: 99 (2019 20:46)  HR: 73 (2019 14:47) (73 - 87)  BP: 116/56 (2019 14:47) (101/63 - 141/61)  BP(mean): --  RR: 16 (2019 14:47) (16 - 18)  SpO2: 96% (2019 19:38) (96% - 96%)    PHYSICAL EXAMINATION:  Not in acute distress, Bangladeshi speaking  General: No pallor, or icterus, afebrile  HEENT:  L eye blindness,  EOMI, no JVD, no Bruit.  Heart: S1+S2 audible, no murmur  Lungs: bilateral  fair air entry, no wheezing, no crepitations.  Abdomen: Soft, non-tender, non-distended , no  rigidity or guarding.  CNS: AAOx2, CN  grossly intact.  Extremities:  No edema      Back:  extensive ecchymoses, bruising      REVIEW OF SYSTEMS:  Patient denies any headache, any vision complaints, runny nose, fever, chills, sore throat. Denies chest pain, shortness of breath, palpitation. Denies nausea, vomiting, abdominal pain, diarrhoea, Denies urinary burning, urgency, frequency, dysuria. Denies weakness in any part of the body or numbness.   At least 10 systems were reviewed in ROS. All systems reviewed  are within normal limits except for the complaints as described in Subjective.    CONSULTS:  Consultant(s) Notes Reviewed by me.   Care Discussed with Consultants/Other Providers where required.        MEDICATIONS:  MEDICATIONS  (STANDING):  aspirin  chewable 81 milliGRAM(s) Oral daily  atorvastatin 10 milliGRAM(s) Oral at bedtime  buDESOnide   0.5 milliGRAM(s) Respule 0.5 milliGRAM(s) Inhalation every 12 hours  docusate sodium 200 milliGRAM(s) Oral at bedtime  escitalopram 20 milliGRAM(s) Oral daily  ferrous    sulfate 325 milliGRAM(s) Oral daily  furosemide    Tablet 20 milliGRAM(s) Oral daily  heparin  Injectable 5000 Unit(s) SubCutaneous every 12 hours  ipratropium    for Nebulization 500 MICROGram(s) Nebulizer two times a day  nicotine - 21 mG/24Hr(s) Patch 1 patch Transdermal daily  oxybutynin 10 milliGRAM(s) Oral two times a day  predniSONE   Tablet 15 milliGRAM(s) Oral daily  QUEtiapine 25 milliGRAM(s) Oral at bedtime  tamsulosin 0.4 milliGRAM(s) Oral at bedtime  traZODone 50 milliGRAM(s) Oral at bedtime    MEDICATIONS  (PRN):  ALBUTerol    90 MICROgram(s) HFA Inhaler 1 Puff(s) Inhalation every 4 hours PRN Shortness of Breath and/or Wheezing  ALPRAZolam 0.5 milliGRAM(s) Oral daily PRN anxiety      LABOROTORY DATA/MICROBIOLOGY/I & O's:                        12.0   9.05  )-----------( 173      ( 2019 06:10 )             37.7     04-12    140  |  99  |  13  ----------------------------<  130<H>  3.8   |  33<H>  |  0.7    Ca    8.0<L>      2019 06:10    TPro  4.6<L>  /  Alb  2.9<L>  /  TBili  1.2  /  DBili  x   /  AST  14  /  ALT  13  /  AlkPhos  81  04-12    PT/INR - ( 2019 15:10 )   PT: 11.40 sec;   INR: 0.99 ratio         PTT - ( 2019 15:10 )  PTT:27.6 sec  Urinalysis Basic - ( 2019 16:20 )    Color: Yellow / Appearance: Clear / S.015 / pH: x  Gluc: x / Ketone: Negative  / Bili: Negative / Urobili: >=8.0 mg/dL   Blood: x / Protein: Negative mg/dL / Nitrite: Negative   Leuk Esterase: Negative / RBC: x / WBC x   Sq Epi: x / Non Sq Epi: x / Bacteria: x      CAPILLARY BLOOD GLUCOSE            Urinalysis Basic - ( 2019 16:20 )    Color: Yellow / Appearance: Clear / S.015 / pH: x  Gluc: x / Ketone: Negative  / Bili: Negative / Urobili: >=8.0 mg/dL   Blood: x / Protein: Negative mg/dL / Nitrite: Negative   Leuk Esterase: Negative / RBC: x / WBC x   Sq Epi: x / Non Sq Epi: x / Bacteria: x                    ASSESSMENT:        88yo male with several chronic conditions including COPD and lung cancer is sent to the ER due to multiple falls over the course of 1 week associated with weakness and unsteady     gait      ASSESSMENT:  Principal Diagnosis:  Frequent falls  Hypocalcemia secondary to hypoalbuminemia  0.9 cm left upper lobe nodule with spiculation, suspicious for neoplasm   Nicotine dependence      Associated Active Comorbid Conditions:  Chronic obstructive pulmonary disease -stable   Dyslipidemia   Benign Prostatic Hypertrophy   history of lung CA , status post radiotherapy    PLAN:   D/c tele   status post fall- no evidence for fracture  Chronic obstructive pulmonary disease -stable , Duonebs as needed   continue with Low dose prednisone  Daliresp  along with budesonide  Dyslipidemia - continue with statins  Physical Therapy/Physiatry consult to be requested.   Depression/WERNER: continue with escitalopram and Seroquel  Coronary artery disease- continue with aspirin and metoprolol  history of Lung CA, status post radiotherapy- .9 cm left upper lobe nodule with spiculation, suspicious for neoplasm - pulmonary consult  Goals of care - family indecisive for now    #Progress Note Handoff  Pending : foals of care, need for placement  Disposition:  Possibly SNF  Discussion: Discussed with patient & family on bedside, HCO non-significant for acute changes [facility] # [phone], [phone] Progress Note:  Provider Speciality                            Hospitalist      ERUMTRENT RODRIGUEZ [id number] 87y Male     CHIEF PRESENTING COMPLAINT:  Patient is a 87y old  Male who presents with a chief complaint of Falls with dizziness (2019 19:57)        SUBJECTIVE:  Patient was seen and examined at bedside.  . No significant overnight events reported.     HISTORY OF PRESENTING ILLNESS:  HPI:  (Family at bedside to assist with history) 86yo male with several chronic conditions including COPD and lung cancer is sent to the ER due to multiple falls over the course of 1 week associated with weakness and unsteady gait. Currently no pain. No modifying factors identified. (2019 19:57)      PAST MEDICAL & SURGICAL HISTORY:  PAST MEDICAL & SURGICAL HISTORY:  Blind one eye  Abdominal aneurysm  Lung cancer  ESRD (end stage renal disease)  COPD (chronic obstructive pulmonary disease)  No significant past surgical history      VITAL SIGNS:  Vital Signs Last 24 Hrs  T(C): 36.7 (2019 14:47), Max: 37.2 (2019 20:46)  T(F): 98.1 (2019 14:47), Max: 99 (2019 20:46)  HR: 73 (2019 14:47) (73 - 87)  BP: 116/56 (2019 14:47) (101/63 - 141/61)  BP(mean): --  RR: 16 (2019 14:47) (16 - 18)  SpO2: 96% (2019 19:38) (96% - 96%)    PHYSICAL EXAMINATION:  Not in acute distress, Portuguese speaking  General: No pallor, or icterus, afebrile  HEENT:  L eye blindness,  EOMI, no JVD, no Bruit.  Heart: S1+S2 audible, no murmur  Lungs: bilateral  fair air entry, no wheezing, no crepitations.  Abdomen: Soft, non-tender, non-distended , no  rigidity or guarding.  CNS: AAOx2, CN  grossly intact.  Extremities:  No edema      Back:  extensive ecchymoses, bruising      REVIEW OF SYSTEMS:  Patient denies any headache, any vision complaints, runny nose, fever, chills, sore throat. Denies chest pain, shortness of breath, palpitation. Denies nausea, vomiting, abdominal pain, diarrhoea, Denies urinary burning, urgency, frequency, dysuria. Denies weakness in any part of the body or numbness.   At least 10 systems were reviewed in ROS. All systems reviewed  are within normal limits except for the complaints as described in Subjective.    CONSULTS:  Consultant(s) Notes Reviewed by me.   Care Discussed with Consultants/Other Providers where required.        MEDICATIONS:  MEDICATIONS  (STANDING):  aspirin  chewable 81 milliGRAM(s) Oral daily  atorvastatin 10 milliGRAM(s) Oral at bedtime  buDESOnide   0.5 milliGRAM(s) Respule 0.5 milliGRAM(s) Inhalation every 12 hours  docusate sodium 200 milliGRAM(s) Oral at bedtime  escitalopram 20 milliGRAM(s) Oral daily  ferrous    sulfate 325 milliGRAM(s) Oral daily  furosemide    Tablet 20 milliGRAM(s) Oral daily  heparin  Injectable 5000 Unit(s) SubCutaneous every 12 hours  ipratropium    for Nebulization 500 MICROGram(s) Nebulizer two times a day  nicotine - 21 mG/24Hr(s) Patch 1 patch Transdermal daily  oxybutynin 10 milliGRAM(s) Oral two times a day  predniSONE   Tablet 15 milliGRAM(s) Oral daily  QUEtiapine 25 milliGRAM(s) Oral at bedtime  tamsulosin 0.4 milliGRAM(s) Oral at bedtime  traZODone 50 milliGRAM(s) Oral at bedtime    MEDICATIONS  (PRN):  ALBUTerol    90 MICROgram(s) HFA Inhaler 1 Puff(s) Inhalation every 4 hours PRN Shortness of Breath and/or Wheezing  ALPRAZolam 0.5 milliGRAM(s) Oral daily PRN anxiety      LABOROTORY DATA/MICROBIOLOGY/I & O's:                        12.0   9.05  )-----------( 173      ( 2019 06:10 )             37.7     04-12    140  |  99  |  13  ----------------------------<  130<H>  3.8   |  33<H>  |  0.7    Ca    8.0<L>      2019 06:10    TPro  4.6<L>  /  Alb  2.9<L>  /  TBili  1.2  /  DBili  x   /  AST  14  /  ALT  13  /  AlkPhos  81  04-12    PT/INR - ( 2019 15:10 )   PT: 11.40 sec;   INR: 0.99 ratio         PTT - ( 2019 15:10 )  PTT:27.6 sec  Urinalysis Basic - ( 2019 16:20 )    Color: Yellow / Appearance: Clear / S.015 / pH: x  Gluc: x / Ketone: Negative  / Bili: Negative / Urobili: >=8.0 mg/dL   Blood: x / Protein: Negative mg/dL / Nitrite: Negative   Leuk Esterase: Negative / RBC: x / WBC x   Sq Epi: x / Non Sq Epi: x / Bacteria: x      CAPILLARY BLOOD GLUCOSE            Urinalysis Basic - ( 2019 16:20 )    Color: Yellow / Appearance: Clear / S.015 / pH: x  Gluc: x / Ketone: Negative  / Bili: Negative / Urobili: >=8.0 mg/dL   Blood: x / Protein: Negative mg/dL / Nitrite: Negative   Leuk Esterase: Negative / RBC: x / WBC x   Sq Epi: x / Non Sq Epi: x / Bacteria: x                    ASSESSMENT:        86yo male with several chronic conditions including COPD and lung cancer is sent to the ER due to multiple falls over the course of 1 week associated with weakness and unsteady     gait      ASSESSMENT:  Principal Diagnosis:  Frequent falls  Hypocalcemia secondary to hypoalbuminemia  0.9 cm left upper lobe nodule with spiculation, suspicious for neoplasm   Nicotine dependence      Associated Active Comorbid Conditions:  Chronic obstructive pulmonary disease -stable   Dyslipidemia   Benign Prostatic Hypertrophy   history of lung CA , status post radiotherapy    PLAN:   D/c tele   status post fall- no evidence for fracture  Chronic obstructive pulmonary disease -stable , Duonebs as needed   continue with Low dose prednisone  Daliresp  along with budesonide  Dyslipidemia - continue with statins  Physical Therapy/Physiatry consult to be requested.   Depression/WERNER: continue with escitalopram and Seroquel  Coronary artery disease- continue with aspirin and metoprolol  history of Lung CA, status post radiotherapy- .9 cm left upper lobe nodule with spiculation, suspicious for neoplasm - pulmonary consult  Goals of care - family indecisive for now    #Progress Note Handoff  Pending : foals of care, need for placement  Disposition:  Possibly SNF  Discussion: Discussed with patient & family on bedside, HCP Miranda # [phone], [phone]

## 2019-04-12 NOTE — PHYSICAL THERAPY INITIAL EVALUATION ADULT - IMPAIRMENTS FOUND, PT EVAL
ergonomics and body mechanics/aerobic capacity/endurance/gait, locomotion, and balance/muscle strength/posture

## 2019-04-13 LAB
ANION GAP SERPL CALC-SCNC: 7 MMOL/L — SIGNIFICANT CHANGE UP (ref 7–14)
BUN SERPL-MCNC: 14 MG/DL — SIGNIFICANT CHANGE UP (ref 10–20)
CALCIUM SERPL-MCNC: 8 MG/DL — LOW (ref 8.5–10.1)
CHLORIDE SERPL-SCNC: 100 MMOL/L — SIGNIFICANT CHANGE UP (ref 98–110)
CO2 SERPL-SCNC: 32 MMOL/L — SIGNIFICANT CHANGE UP (ref 17–32)
CREAT SERPL-MCNC: 0.7 MG/DL — SIGNIFICANT CHANGE UP (ref 0.7–1.5)
GLUCOSE SERPL-MCNC: 153 MG/DL — HIGH (ref 70–99)
HCT VFR BLD CALC: 36.2 % — LOW (ref 42–52)
HGB BLD-MCNC: 11.7 G/DL — LOW (ref 14–18)
MCHC RBC-ENTMCNC: 32.3 G/DL — SIGNIFICANT CHANGE UP (ref 32–37)
MCHC RBC-ENTMCNC: 32.6 PG — HIGH (ref 27–31)
MCV RBC AUTO: 100.8 FL — HIGH (ref 80–94)
NRBC # BLD: 0 /100 WBCS — SIGNIFICANT CHANGE UP (ref 0–0)
PLATELET # BLD AUTO: 188 K/UL — SIGNIFICANT CHANGE UP (ref 130–400)
POTASSIUM SERPL-MCNC: 4 MMOL/L — SIGNIFICANT CHANGE UP (ref 3.5–5)
POTASSIUM SERPL-SCNC: 4 MMOL/L — SIGNIFICANT CHANGE UP (ref 3.5–5)
RBC # BLD: 3.59 M/UL — LOW (ref 4.7–6.1)
RBC # FLD: 15.9 % — HIGH (ref 11.5–14.5)
SODIUM SERPL-SCNC: 139 MMOL/L — SIGNIFICANT CHANGE UP (ref 135–146)
WBC # BLD: 10.19 K/UL — SIGNIFICANT CHANGE UP (ref 4.8–10.8)
WBC # FLD AUTO: 10.19 K/UL — SIGNIFICANT CHANGE UP (ref 4.8–10.8)

## 2019-04-13 RX ORDER — ACETAMINOPHEN 500 MG
650 TABLET ORAL EVERY 6 HOURS
Qty: 0 | Refills: 0 | Status: DISCONTINUED | OUTPATIENT
Start: 2019-04-13 | End: 2019-04-15

## 2019-04-13 RX ORDER — DUREZOL 0.5 MG/ML
1 EMULSION OPHTHALMIC
Qty: 0 | Refills: 0 | Status: DISCONTINUED | OUTPATIENT
Start: 2019-04-13 | End: 2019-04-15

## 2019-04-13 RX ORDER — ROFLUMILAST 500 UG/1
500 TABLET ORAL DAILY
Qty: 0 | Refills: 0 | Status: DISCONTINUED | OUTPATIENT
Start: 2019-04-13 | End: 2019-04-15

## 2019-04-13 RX ORDER — IPRATROPIUM/ALBUTEROL SULFATE 18-103MCG
3 AEROSOL WITH ADAPTER (GRAM) INHALATION EVERY 8 HOURS
Qty: 0 | Refills: 0 | Status: DISCONTINUED | OUTPATIENT
Start: 2019-04-13 | End: 2019-04-15

## 2019-04-13 RX ORDER — OLOPATADINE HYDROCHLORIDE 1 MG/ML
1 SOLUTION/ DROPS OPHTHALMIC DAILY
Qty: 0 | Refills: 0 | Status: DISCONTINUED | OUTPATIENT
Start: 2019-04-13 | End: 2019-04-15

## 2019-04-13 RX ORDER — LOTEPREDNOL ETABONATE 2 MG/ML
1 SUSPENSION/ DROPS OPHTHALMIC DAILY
Qty: 0 | Refills: 0 | Status: DISCONTINUED | OUTPATIENT
Start: 2019-04-13 | End: 2019-04-15

## 2019-04-13 RX ADMIN — QUETIAPINE FUMARATE 25 MILLIGRAM(S): 200 TABLET, FILM COATED ORAL at 21:27

## 2019-04-13 RX ADMIN — Medication 650 MILLIGRAM(S): at 12:55

## 2019-04-13 RX ADMIN — Medication 20 MILLIGRAM(S): at 06:35

## 2019-04-13 RX ADMIN — HEPARIN SODIUM 5000 UNIT(S): 5000 INJECTION INTRAVENOUS; SUBCUTANEOUS at 06:34

## 2019-04-13 RX ADMIN — LOTEPREDNOL ETABONATE 1 DROP(S): 2 SUSPENSION/ DROPS OPHTHALMIC at 17:56

## 2019-04-13 RX ADMIN — ESCITALOPRAM OXALATE 20 MILLIGRAM(S): 10 TABLET, FILM COATED ORAL at 11:52

## 2019-04-13 RX ADMIN — Medication 325 MILLIGRAM(S): at 11:52

## 2019-04-13 RX ADMIN — Medication 500 MICROGRAM(S): at 08:16

## 2019-04-13 RX ADMIN — ROFLUMILAST 500 MICROGRAM(S): 500 TABLET ORAL at 17:57

## 2019-04-13 RX ADMIN — TAMSULOSIN HYDROCHLORIDE 0.4 MILLIGRAM(S): 0.4 CAPSULE ORAL at 21:27

## 2019-04-13 RX ADMIN — DUREZOL 1 DROP(S): 0.5 EMULSION OPHTHALMIC at 17:52

## 2019-04-13 RX ADMIN — OLOPATADINE HYDROCHLORIDE 1 DROP(S): 1 SOLUTION/ DROPS OPHTHALMIC at 17:57

## 2019-04-13 RX ADMIN — Medication 10 MILLIGRAM(S): at 06:35

## 2019-04-13 RX ADMIN — Medication 50 MILLIGRAM(S): at 21:27

## 2019-04-13 RX ADMIN — Medication 650 MILLIGRAM(S): at 11:56

## 2019-04-13 RX ADMIN — Medication 0.5 MILLIGRAM(S): at 21:28

## 2019-04-13 RX ADMIN — Medication 200 MILLIGRAM(S): at 21:28

## 2019-04-13 RX ADMIN — Medication 1 PATCH: at 11:53

## 2019-04-13 RX ADMIN — Medication 81 MILLIGRAM(S): at 11:52

## 2019-04-13 RX ADMIN — Medication 10 MILLIGRAM(S): at 17:54

## 2019-04-13 RX ADMIN — Medication 15 MILLIGRAM(S): at 06:36

## 2019-04-13 RX ADMIN — ATORVASTATIN CALCIUM 10 MILLIGRAM(S): 80 TABLET, FILM COATED ORAL at 21:28

## 2019-04-13 RX ADMIN — HEPARIN SODIUM 5000 UNIT(S): 5000 INJECTION INTRAVENOUS; SUBCUTANEOUS at 17:50

## 2019-04-13 NOTE — PROGRESS NOTE ADULT - SUBJECTIVE AND OBJECTIVE BOX
Progress Note:  Provider Speciality                            Hospitalist      ERUMTRENT [id number] 87y Male     CHIEF PRESENTING COMPLAINT:  Patient is a 87y old  Male who presents with a chief complaint of Falls with dizziness (2019 15:34)        SUBJECTIVE:  Patient was seen and examined at bedside.    No significant overnight events reported.     HISTORY OF PRESENTING ILLNESS:  HPI:  (Family at bedside to assist with history) 88yo male with several chronic conditions including COPD and lung cancer is sent to the ER due to multiple falls over the course of 1 week associated with weakness and unsteady gait. Currently no pain. No modifying factors identified. (2019 19:57)      PAST MEDICAL & SURGICAL HISTORY:  PAST MEDICAL & SURGICAL HISTORY:  Blind one eye  Abdominal aneurysm  Lung cancer  ESRD (end stage renal disease)  COPD (chronic obstructive pulmonary disease)  No significant past surgical history      VITAL SIGNS:  Vital Signs Last 24 Hrs  T(C): 35.7 (2019 06:13), Max: 36.7 (2019 14:47)  T(F): 96.3 (2019 06:13), Max: 98.1 (2019 14:47)  HR: 63 (2019 06:13) (63 - 73)  BP: 127/58 (2019 06:13) (116/56 - 127/58)  BP(mean): --  RR: 16 (2019 06:13) (16 - 16)  SpO2: --    PHYSICAL EXAMINATION:  Not in acute distress, Hungarian speaking  General: No pallor, or icterus, afebrile  HEENT:  L eye blindness,  EOMI, no JVD, no Bruit.  Heart: S1+S2 audible, no murmur  Lungs: bilateral  fair air entry, no wheezing, no crepitations.  Abdomen: Soft, non-tender, non-distended , no  rigidity or guarding.  CNS: AAOx2, CN  grossly intact.  Extremities:  No edema      Back:  extensive ecchymoses, bruising      REVIEW OF SYSTEMS:  Review of systems is not possible in this patient.     CONSULTS:  Consultant(s) Notes Reviewed by me.   Care Discussed with Consultants/Other Providers where required.        MEDICATIONS:  MEDICATIONS  (STANDING):  ALBUTerol/ipratropium for Nebulization 3 milliLiter(s) Nebulizer every 8 hours  aspirin  chewable 81 milliGRAM(s) Oral daily  atorvastatin 10 milliGRAM(s) Oral at bedtime  buDESOnide   0.5 milliGRAM(s) Respule 0.5 milliGRAM(s) Inhalation every 12 hours  docusate sodium 200 milliGRAM(s) Oral at bedtime  escitalopram 20 milliGRAM(s) Oral daily  ferrous    sulfate 325 milliGRAM(s) Oral daily  furosemide    Tablet 20 milliGRAM(s) Oral daily  heparin  Injectable 5000 Unit(s) SubCutaneous every 12 hours  nicotine - 21 mG/24Hr(s) Patch 1 patch Transdermal daily  oxybutynin 10 milliGRAM(s) Oral two times a day  predniSONE   Tablet 15 milliGRAM(s) Oral daily  QUEtiapine 25 milliGRAM(s) Oral at bedtime  tamsulosin 0.4 milliGRAM(s) Oral at bedtime  traZODone 50 milliGRAM(s) Oral at bedtime    MEDICATIONS  (PRN):  acetaminophen   Tablet .. 650 milliGRAM(s) Oral every 6 hours PRN Temp greater or equal to 38C (100.4F), Mild Pain (1 - 3), Moderate Pain (4 - 6)  ALPRAZolam 0.5 milliGRAM(s) Oral daily PRN anxiety      LABOROTORY DATA/MICROBIOLOGY/I & O's:                        11.7   10.19 )-----------( 188      ( 2019 07:32 )             36.2     04-13    139  |  100  |  14  ----------------------------<  153<H>  4.0   |  32  |  0.7    Ca    8.0<L>      2019 07:32    TPro  4.6<L>  /  Alb  2.9<L>  /  TBili  1.2  /  DBili  x   /  AST  14  /  ALT  13  /  AlkPhos  81  04-12    PT/INR - ( 2019 15:10 )   PT: 11.40 sec;   INR: 0.99 ratio         PTT - ( 2019 15:10 )  PTT:27.6 sec  Urinalysis Basic - ( 2019 16:20 )    Color: Yellow / Appearance: Clear / S.015 / pH: x  Gluc: x / Ketone: Negative  / Bili: Negative / Urobili: >=8.0 mg/dL   Blood: x / Protein: Negative mg/dL / Nitrite: Negative   Leuk Esterase: Negative / RBC: x / WBC x   Sq Epi: x / Non Sq Epi: x / Bacteria: x      CAPILLARY BLOOD GLUCOSE            Urinalysis Basic - ( 2019 16:20 )    Color: Yellow / Appearance: Clear / S.015 / pH: x  Gluc: x / Ketone: Negative  / Bili: Negative / Urobili: >=8.0 mg/dL   Blood: x / Protein: Negative mg/dL / Nitrite: Negative   Leuk Esterase: Negative / RBC: x / WBC x   Sq Epi: x / Non Sq Epi: x / Bacteria: x                    ASSESSMENT:        88yo male with several chronic conditions including COPD and lung cancer is sent to the ER due to multiple falls over the course of 1 week associated with weakness and unsteady     gait      ASSESSMENT:  Principal Diagnosis:  Frequent falls  Hypocalcemia secondary to hypoalbuminemia  0.9 cm left upper lobe nodule with spiculation, suspicious for neoplasm   Nicotine dependence      Associated Active Comorbid Conditions:  Chronic obstructive pulmonary disease -stable   Dyslipidemia   Benign Prostatic Hypertrophy   history of lung CA , status post radiotherapy    PLAN:   D/c tele   status post fall- no evidence for fracture  Chronic obstructive pulmonary disease -stable , Duonebs as needed   continue with Low dose prednisone  Daliresp  along with budesonide  Dyslipidemia - continue with statins  Physical Therapy/Physiatry consult to be requested.   Depression/WERNER: continue with escitalopram and Seroquel  Coronary artery disease- continue with aspirin and metoprolol  history of Lung CA, status post radiotherapy- .9 cm left upper lobe nodule with spiculation, suspicious for neoplasm - pulmonary consult  Goals of care - family indecisive for now    #Progress Note Handoff  Pending : foals of care, need for placement  Disposition:  Possibly SNF  Discussion: Discussed with patient & family on bedside, HCP Miranda # [phone], [phone] Progress Note:  Provider Speciality                            Hospitalist      ERUMTRENT [id number] 87y Male     CHIEF PRESENTING COMPLAINT:  Patient is a 87y old  Male who presents with a chief complaint of Falls with dizziness (2019 15:34)        SUBJECTIVE:  Patient was seen and examined at bedside.    No significant overnight events reported.     HISTORY OF PRESENTING ILLNESS:  HPI:  (Family at bedside to assist with history) 86yo male with several chronic conditions including COPD and lung cancer is sent to the ER due to multiple falls over the course of 1 week associated with weakness and unsteady gait. Currently no pain. No modifying factors identified. (2019 19:57)      PAST MEDICAL & SURGICAL HISTORY:  PAST MEDICAL & SURGICAL HISTORY:  Blind one eye  Abdominal aneurysm  Lung cancer  ESRD (end stage renal disease)  COPD (chronic obstructive pulmonary disease)  No significant past surgical history      VITAL SIGNS:  Vital Signs Last 24 Hrs  T(C): 35.7 (2019 06:13), Max: 36.7 (2019 14:47)  T(F): 96.3 (2019 06:13), Max: 98.1 (2019 14:47)  HR: 63 (2019 06:13) (63 - 73)  BP: 127/58 (2019 06:13) (116/56 - 127/58)  BP(mean): --  RR: 16 (2019 06:13) (16 - 16)  SpO2: --    PHYSICAL EXAMINATION:  Not in acute distress, Taiwanese speaking  General: No pallor, or icterus, afebrile  HEENT:  L eye blindness,  EOMI, no JVD, no Bruit.  Heart: S1+S2 audible, no murmur  Lungs: bilateral  fair air entry, no wheezing, no crepitations.  Abdomen: Soft, non-tender, non-distended , no  rigidity or guarding.  CNS: AAOx2, CN  grossly intact.  Extremities:  No edema      Back:  extensive ecchymoses, bruising      REVIEW OF SYSTEMS:  Review of systems is not possible in this patient.     CONSULTS:  Consultant(s) Notes Reviewed by me.   Care Discussed with Consultants/Other Providers where required.        MEDICATIONS:  MEDICATIONS  (STANDING):  ALBUTerol/ipratropium for Nebulization 3 milliLiter(s) Nebulizer every 8 hours  aspirin  chewable 81 milliGRAM(s) Oral daily  atorvastatin 10 milliGRAM(s) Oral at bedtime  buDESOnide   0.5 milliGRAM(s) Respule 0.5 milliGRAM(s) Inhalation every 12 hours  docusate sodium 200 milliGRAM(s) Oral at bedtime  escitalopram 20 milliGRAM(s) Oral daily  ferrous    sulfate 325 milliGRAM(s) Oral daily  furosemide    Tablet 20 milliGRAM(s) Oral daily  heparin  Injectable 5000 Unit(s) SubCutaneous every 12 hours  nicotine - 21 mG/24Hr(s) Patch 1 patch Transdermal daily  oxybutynin 10 milliGRAM(s) Oral two times a day  predniSONE   Tablet 15 milliGRAM(s) Oral daily  QUEtiapine 25 milliGRAM(s) Oral at bedtime  tamsulosin 0.4 milliGRAM(s) Oral at bedtime  traZODone 50 milliGRAM(s) Oral at bedtime    MEDICATIONS  (PRN):  acetaminophen   Tablet .. 650 milliGRAM(s) Oral every 6 hours PRN Temp greater or equal to 38C (100.4F), Mild Pain (1 - 3), Moderate Pain (4 - 6)  ALPRAZolam 0.5 milliGRAM(s) Oral daily PRN anxiety      LABOROTORY DATA/MICROBIOLOGY/I & O's:                        11.7   10.19 )-----------( 188      ( 2019 07:32 )             36.2     04-13    139  |  100  |  14  ----------------------------<  153<H>  4.0   |  32  |  0.7    Ca    8.0<L>      2019 07:32    TPro  4.6<L>  /  Alb  2.9<L>  /  TBili  1.2  /  DBili  x   /  AST  14  /  ALT  13  /  AlkPhos  81  04-12    PT/INR - ( 2019 15:10 )   PT: 11.40 sec;   INR: 0.99 ratio         PTT - ( 2019 15:10 )  PTT:27.6 sec  Urinalysis Basic - ( 2019 16:20 )    Color: Yellow / Appearance: Clear / S.015 / pH: x  Gluc: x / Ketone: Negative  / Bili: Negative / Urobili: >=8.0 mg/dL   Blood: x / Protein: Negative mg/dL / Nitrite: Negative   Leuk Esterase: Negative / RBC: x / WBC x   Sq Epi: x / Non Sq Epi: x / Bacteria: x      CAPILLARY BLOOD GLUCOSE            Urinalysis Basic - ( 2019 16:20 )    Color: Yellow / Appearance: Clear / S.015 / pH: x  Gluc: x / Ketone: Negative  / Bili: Negative / Urobili: >=8.0 mg/dL   Blood: x / Protein: Negative mg/dL / Nitrite: Negative   Leuk Esterase: Negative / RBC: x / WBC x   Sq Epi: x / Non Sq Epi: x / Bacteria: x                    ASSESSMENT:        86yo male with several chronic conditions including COPD and lung cancer is sent to the ER due to multiple falls over the course of 1 week associated with weakness and unsteady     gait      ASSESSMENT:  Principal Diagnosis:  Frequent falls  Hypocalcemia secondary to hypoalbuminemia  0.9 cm left upper lobe nodule with spiculation, suspicious for neoplasm   Nicotine dependence      Associated Active Comorbid Conditions:  Chronic obstructive pulmonary disease -stable   Dyslipidemia   Benign Prostatic Hypertrophy   history of lung CA , status post radiotherapy    PLAN:   D/c tele   status post fall- no evidence for fracture  Chronic obstructive pulmonary disease -stable , Duonebs as needed   continue with Low dose prednisone  Daliresp  along with budesonide  Dyslipidemia - continue with statins  Physical Therapy/Physiatry consult to be requested.   Depression/WERNER: continue with escitalopram and Seroquel  Coronary artery disease- continue with aspirin and metoprolol  history of Lung CA, status post radiotherapy- .9 cm left upper lobe nodule with spiculation, suspicious for neoplasm - pulmonary consult  Goals of care - family indecisive for now    #Progress Note Handoff  Pending : foals of care, need for placement  Disposition:  Possibly SNF  Goals of Care:  Spoke with HCP, Ms Jean( #[phone]) to discuss overall goals of care. Discussed patient's current prognosis, diagnosis, and treatment options.      Family is collectively making decisions on his behalf.  Patient's family verbalized understanding of patient's very complicated medical conditions. HCP will discuss among family     members and after talking to patient will come to a conclusion about DNR/DNI. Until that patient  is full code.

## 2019-04-13 NOTE — DIETITIAN INITIAL EVALUATION ADULT. - SOURCE
patient/Pt speaks little English. A  phone was used. Confusion noted however Pt was able to answer questions appropriately.

## 2019-04-13 NOTE — DIETITIAN INITIAL EVALUATION ADULT. - ADHERENCE
Pt reports that his doctor tells him he is suppose to be following a low salt diet but he likes salt and eats it anyway since his portions are not big. Pt also reports his doctor tells him to stop smoking. He reports he cut back on smoking to "4-5 cigarettes" but has not quit.

## 2019-04-13 NOTE — DIETITIAN INITIAL EVALUATION ADULT. - ENERGY NEEDS
Calories: 1583-1715kcals/day (MSJ A.F 1.2-1.3)   Protein: 67-80g/day (1-1.2g/kg)  Fluid: 1:1ml/kcal or fluid as per LIP

## 2019-04-13 NOTE — GOALS OF CARE CONVERSATION - PERSONAL ADVANCE DIRECTIVE - CONVERSATION DETAILS
Spoke with HCP, Ms Jean( #[phone]) to discuss overall goals of care. Discussed patient's current prognosis, diagnosis, and treatment options.  Family is collectively making decisions on his behalf.  Patient's family verbalized understanding of patient's very complicated medical conditions. HCP will discuss among family members and after talking to patient will come to a conclusion about DNR/DNI. Until that patient  is full code.

## 2019-04-13 NOTE — DIETITIAN INITIAL EVALUATION ADULT. - OTHER INFO
Reason for assessment: Consult for assessment. PMH: COPD, lung CA. Pt presented to ED for multiple falls, weakness, and unsteady gait. Pt reports a 10# wt loss in 2 months. Pt had a past admit wt 4 months ago of 68kg. Current wt 66.9kg. Question accuracy. Unsure of last BM. Pt did not have one today. Pt reports that he has been taking stool softeners. NKFA.

## 2019-04-14 LAB
ANION GAP SERPL CALC-SCNC: 10 MMOL/L — SIGNIFICANT CHANGE UP (ref 7–14)
BUN SERPL-MCNC: 14 MG/DL — SIGNIFICANT CHANGE UP (ref 10–20)
CALCIUM SERPL-MCNC: 8.3 MG/DL — LOW (ref 8.5–10.1)
CHLORIDE SERPL-SCNC: 101 MMOL/L — SIGNIFICANT CHANGE UP (ref 98–110)
CO2 SERPL-SCNC: 28 MMOL/L — SIGNIFICANT CHANGE UP (ref 17–32)
CREAT SERPL-MCNC: 0.9 MG/DL — SIGNIFICANT CHANGE UP (ref 0.7–1.5)
GLUCOSE BLDC GLUCOMTR-MCNC: 154 MG/DL — HIGH (ref 70–99)
GLUCOSE SERPL-MCNC: 168 MG/DL — HIGH (ref 70–99)
HCT VFR BLD CALC: 38.6 % — LOW (ref 42–52)
HGB BLD-MCNC: 12.5 G/DL — LOW (ref 14–18)
MCHC RBC-ENTMCNC: 32.4 G/DL — SIGNIFICANT CHANGE UP (ref 32–37)
MCHC RBC-ENTMCNC: 32.6 PG — HIGH (ref 27–31)
MCV RBC AUTO: 100.5 FL — HIGH (ref 80–94)
NRBC # BLD: 0 /100 WBCS — SIGNIFICANT CHANGE UP (ref 0–0)
PLATELET # BLD AUTO: 209 K/UL — SIGNIFICANT CHANGE UP (ref 130–400)
POTASSIUM SERPL-MCNC: 4.1 MMOL/L — SIGNIFICANT CHANGE UP (ref 3.5–5)
POTASSIUM SERPL-SCNC: 4.1 MMOL/L — SIGNIFICANT CHANGE UP (ref 3.5–5)
RBC # BLD: 3.84 M/UL — LOW (ref 4.7–6.1)
RBC # FLD: 15.9 % — HIGH (ref 11.5–14.5)
SODIUM SERPL-SCNC: 139 MMOL/L — SIGNIFICANT CHANGE UP (ref 135–146)
WBC # BLD: 10.12 K/UL — SIGNIFICANT CHANGE UP (ref 4.8–10.8)
WBC # FLD AUTO: 10.12 K/UL — SIGNIFICANT CHANGE UP (ref 4.8–10.8)

## 2019-04-14 RX ADMIN — Medication 0.5 MILLIGRAM(S): at 22:11

## 2019-04-14 RX ADMIN — HEPARIN SODIUM 5000 UNIT(S): 5000 INJECTION INTRAVENOUS; SUBCUTANEOUS at 17:24

## 2019-04-14 RX ADMIN — LOTEPREDNOL ETABONATE 1 DROP(S): 2 SUSPENSION/ DROPS OPHTHALMIC at 11:58

## 2019-04-14 RX ADMIN — ESCITALOPRAM OXALATE 20 MILLIGRAM(S): 10 TABLET, FILM COATED ORAL at 11:57

## 2019-04-14 RX ADMIN — Medication 200 MILLIGRAM(S): at 22:11

## 2019-04-14 RX ADMIN — DUREZOL 1 DROP(S): 0.5 EMULSION OPHTHALMIC at 17:24

## 2019-04-14 RX ADMIN — HEPARIN SODIUM 5000 UNIT(S): 5000 INJECTION INTRAVENOUS; SUBCUTANEOUS at 05:41

## 2019-04-14 RX ADMIN — Medication 1 PATCH: at 11:59

## 2019-04-14 RX ADMIN — Medication 325 MILLIGRAM(S): at 11:57

## 2019-04-14 RX ADMIN — Medication 1 PATCH: at 19:30

## 2019-04-14 RX ADMIN — TAMSULOSIN HYDROCHLORIDE 0.4 MILLIGRAM(S): 0.4 CAPSULE ORAL at 22:10

## 2019-04-14 RX ADMIN — ATORVASTATIN CALCIUM 10 MILLIGRAM(S): 80 TABLET, FILM COATED ORAL at 22:11

## 2019-04-14 RX ADMIN — Medication 0.5 MILLIGRAM(S): at 08:43

## 2019-04-14 RX ADMIN — OLOPATADINE HYDROCHLORIDE 1 DROP(S): 1 SOLUTION/ DROPS OPHTHALMIC at 11:59

## 2019-04-14 RX ADMIN — Medication 1 PATCH: at 11:58

## 2019-04-14 RX ADMIN — Medication 50 MILLIGRAM(S): at 22:10

## 2019-04-14 RX ADMIN — Medication 3 MILLILITER(S): at 08:04

## 2019-04-14 RX ADMIN — Medication 20 MILLIGRAM(S): at 05:40

## 2019-04-14 RX ADMIN — Medication 15 MILLIGRAM(S): at 05:42

## 2019-04-14 RX ADMIN — ROFLUMILAST 500 MICROGRAM(S): 500 TABLET ORAL at 11:56

## 2019-04-14 RX ADMIN — Medication 81 MILLIGRAM(S): at 11:57

## 2019-04-14 RX ADMIN — Medication 10 MILLIGRAM(S): at 05:40

## 2019-04-14 RX ADMIN — QUETIAPINE FUMARATE 25 MILLIGRAM(S): 200 TABLET, FILM COATED ORAL at 22:10

## 2019-04-14 RX ADMIN — DUREZOL 1 DROP(S): 0.5 EMULSION OPHTHALMIC at 05:41

## 2019-04-14 NOTE — PROGRESS NOTE ADULT - SUBJECTIVE AND OBJECTIVE BOX
Progress Note:  Provider Speciality                            Hospitalist      ERUMTRENT RODRIGUEZ [id number] 87y Male     CHIEF PRESENTING COMPLAINT:  Patient is a 87y old  Male who presents with a chief complaint of Falls with dizziness (13 Apr 2019 11:40)        SUBJECTIVE:  Patient was seen and examined at bedside. Reports improvement in  presenting complaint. No significant overnight events reported.     HISTORY OF PRESENTING ILLNESS:  HPI:  (Family at bedside to assist with history) 88yo male with several chronic conditions including COPD and lung cancer is sent to the ER due to multiple falls over the course of 1 week associated with weakness and unsteady gait. Currently no pain. No modifying factors identified. (11 Apr 2019 19:57)      PAST MEDICAL & SURGICAL HISTORY:  PAST MEDICAL & SURGICAL HISTORY:  Blind one eye  Abdominal aneurysm  Lung cancer  ESRD (end stage renal disease)  COPD (chronic obstructive pulmonary disease)  No significant past surgical history      VITAL SIGNS:  Vital Signs Last 24 Hrs  T(C): 36 (14 Apr 2019 06:17), Max: 36.3 (13 Apr 2019 14:08)  T(F): 96.8 (14 Apr 2019 06:17), Max: 97.3 (13 Apr 2019 14:08)  HR: 70 (14 Apr 2019 06:17) (58 - 70)  BP: 147/67 (14 Apr 2019 06:17) (118/57 - 147/67)  BP(mean): --  RR: 16 (14 Apr 2019 06:17) (16 - 16)  SpO2: --    PHYSICAL EXAMINATION:      Not in acute distress, Bengali speaking  General: No pallor, or icterus, afebrile  HEENT:  L eye blindness,  EOMI, no JVD, no Bruit.  Heart: S1+S2 audible, no murmur  Lungs: bilateral  fair air entry, no wheezing, no crepitations.  Abdomen: Soft, non-tender, non-distended , no  rigidity or guarding.  CNS: AAOx2, CN  grossly intact.  Extremities:  No edema      Back:  extensive ecchymoses, bruising      REVIEW OF SYSTEMS:  A complete meaningful review of systems could not be reliably obtained in this patient.     CONSULTS:  Consultant(s) Notes Reviewed by me.   Care Discussed with Consultants/Other Providers where required.        MEDICATIONS:  MEDICATIONS  (STANDING):  ALBUTerol/ipratropium for Nebulization 3 milliLiter(s) Nebulizer every 8 hours  aspirin  chewable 81 milliGRAM(s) Oral daily  atorvastatin 10 milliGRAM(s) Oral at bedtime  buDESOnide   0.5 milliGRAM(s) Respule 0.5 milliGRAM(s) Inhalation every 12 hours  difluprednate 0.05% Ophthalmic Emulsion 1 Drop(s) Left EYE two times a day  docusate sodium 200 milliGRAM(s) Oral at bedtime  escitalopram 20 milliGRAM(s) Oral daily  ferrous    sulfate 325 milliGRAM(s) Oral daily  furosemide    Tablet 20 milliGRAM(s) Oral daily  heparin  Injectable 5000 Unit(s) SubCutaneous every 12 hours  loteprednol 0.2% Ophthalmic Suspension 1 Drop(s) Both EYES daily  nicotine - 21 mG/24Hr(s) Patch 1 patch Transdermal daily  olopatadine 0.1% Ophthalmic Solution 1 Drop(s) Both EYES daily  oxybutynin 10 milliGRAM(s) Oral two times a day  predniSONE   Tablet 15 milliGRAM(s) Oral daily  QUEtiapine 25 milliGRAM(s) Oral at bedtime  roflumilast 500 MICROGram(s) Oral daily  tamsulosin 0.4 milliGRAM(s) Oral at bedtime  traZODone 50 milliGRAM(s) Oral at bedtime    MEDICATIONS  (PRN):  acetaminophen   Tablet .. 650 milliGRAM(s) Oral every 6 hours PRN Temp greater or equal to 38C (100.4F), Mild Pain (1 - 3), Moderate Pain (4 - 6)  ALPRAZolam 0.5 milliGRAM(s) Oral daily PRN anxiety      LABOROTORY DATA/MICROBIOLOGY/I & O's:                        12.5   10.12 )-----------( 209      ( 14 Apr 2019 08:26 )             38.6     04-14    139  |  101  |  14  ----------------------------<  168<H>  4.1   |  28  |  0.9    Ca    8.3<L>      14 Apr 2019 08:26          CAPILLARY BLOOD GLUCOSE      POCT Blood Glucose.: 154 mg/dL (14 Apr 2019 07:48)                        ASSESSMENT:        88yo male with several chronic conditions including COPD and lung cancer is sent to the ER due to multiple falls over the course of 1 week associated with weakness and unsteady     gait      ASSESSMENT:  Principal Diagnosis:  Frequent falls  Hypocalcemia secondary to hypoalbuminemia  0.9 cm left upper lobe nodule with spiculation, suspicious for neoplasm   Nicotine dependence      Associated Active Comorbid Conditions:  Chronic obstructive pulmonary disease -stable   Dyslipidemia   Benign Prostatic Hypertrophy   history of lung CA , status post radiotherapy    PLAN:   D/c tele   status post fall- no evidence for fracture  Chronic obstructive pulmonary disease -stable , Duonebs as needed   continue with Low dose prednisone  Daliresp  along with budesonide  Dyslipidemia - continue with statins  Physical Therapy/Physiatry consult to be requested.   Depression/WERNER: continue with escitalopram and Seroquel  Coronary artery disease- continue with aspirin and metoprolol  history of Lung CA, status post radiotherapy- .9 cm left upper lobe nodule with spiculation, suspicious for neoplasm - pulmonary consult  Goals of care - family indecisive for now    #Progress Note Handoff  Pending : foals of care, need for placement  Disposition:  Possibly SNF  Goals of Care:  Spoke with HCP, Ms Jean( #[phone]) to discuss overall goals of care. Discussed patient's current prognosis, diagnosis, and treatment options.      Family is collectively making decisions on his behalf.  Patient's family verbalized understanding of patient's very complicated medical conditions. HCP will discuss among family     members and after talking to patient will come to a conclusion about DNR/DNI. Until that patient  is full code.

## 2019-04-15 ENCOUNTER — TRANSCRIPTION ENCOUNTER (OUTPATIENT)
Age: 84
End: 2019-04-15

## 2019-04-15 VITALS
HEIGHT: 60 IN | WEIGHT: 249.56 LBS | SYSTOLIC BLOOD PRESSURE: 141 MMHG | DIASTOLIC BLOOD PRESSURE: 94 MMHG | RESPIRATION RATE: 16 BRPM | HEART RATE: 87 BPM | TEMPERATURE: 97 F

## 2019-04-15 PROCEDURE — 93970 EXTREMITY STUDY: CPT | Mod: 26

## 2019-04-15 RX ORDER — LOTEPREDNOL ETABONATE 2 MG/ML
1 SUSPENSION/ DROPS OPHTHALMIC
Qty: 0 | Refills: 0 | COMMUNITY
Start: 2019-04-15

## 2019-04-15 RX ORDER — HOMATROPINE HBR 5 %
2 DROPS OPHTHALMIC (EYE)
Qty: 0 | Refills: 0 | COMMUNITY

## 2019-04-15 RX ORDER — IPRATROPIUM/ALBUTEROL SULFATE 18-103MCG
3 AEROSOL WITH ADAPTER (GRAM) INHALATION
Qty: 0 | Refills: 0 | COMMUNITY
Start: 2019-04-15

## 2019-04-15 RX ORDER — ALPRAZOLAM 0.25 MG
1 TABLET ORAL
Qty: 0 | Refills: 0 | COMMUNITY
Start: 2019-04-15

## 2019-04-15 RX ORDER — TRAZODONE HCL 50 MG
1 TABLET ORAL
Qty: 0 | Refills: 0 | COMMUNITY
Start: 2019-04-15

## 2019-04-15 RX ORDER — ACETAMINOPHEN 500 MG
2 TABLET ORAL
Qty: 0 | Refills: 0 | COMMUNITY
Start: 2019-04-15

## 2019-04-15 RX ADMIN — HEPARIN SODIUM 5000 UNIT(S): 5000 INJECTION INTRAVENOUS; SUBCUTANEOUS at 06:08

## 2019-04-15 RX ADMIN — Medication 1 PATCH: at 11:08

## 2019-04-15 RX ADMIN — Medication 20 MILLIGRAM(S): at 06:07

## 2019-04-15 RX ADMIN — ROFLUMILAST 500 MICROGRAM(S): 500 TABLET ORAL at 11:06

## 2019-04-15 RX ADMIN — DUREZOL 1 DROP(S): 0.5 EMULSION OPHTHALMIC at 06:08

## 2019-04-15 RX ADMIN — Medication 81 MILLIGRAM(S): at 11:08

## 2019-04-15 RX ADMIN — Medication 15 MILLIGRAM(S): at 06:07

## 2019-04-15 RX ADMIN — ESCITALOPRAM OXALATE 20 MILLIGRAM(S): 10 TABLET, FILM COATED ORAL at 11:08

## 2019-04-15 RX ADMIN — OLOPATADINE HYDROCHLORIDE 1 DROP(S): 1 SOLUTION/ DROPS OPHTHALMIC at 11:09

## 2019-04-15 RX ADMIN — LOTEPREDNOL ETABONATE 1 DROP(S): 2 SUSPENSION/ DROPS OPHTHALMIC at 11:09

## 2019-04-15 RX ADMIN — Medication 3 MILLILITER(S): at 07:15

## 2019-04-15 RX ADMIN — Medication 1 PATCH: at 12:41

## 2019-04-15 RX ADMIN — Medication 3 MILLILITER(S): at 16:17

## 2019-04-15 RX ADMIN — Medication 10 MILLIGRAM(S): at 06:07

## 2019-04-15 RX ADMIN — Medication 40 MILLIGRAM(S): at 13:45

## 2019-04-15 RX ADMIN — Medication 0.5 MILLIGRAM(S): at 11:06

## 2019-04-15 RX ADMIN — Medication 1 PATCH: at 08:36

## 2019-04-15 NOTE — CONSULT NOTE ADULT - SUBJECTIVE AND OBJECTIVE BOX
TRENT DANIELSON        Patient is a 87y old  Male who presents with a chief complaint of Falls with dizziness (14 Apr 2019 10:11)      HPI:  (Family at bedside to assist with history) 86yo male with several chronic conditions including COPD and lung cancer is sent to the ER due to multiple falls over the course of 1 week associated with weakness and unsteady gait. Currently no pain. No modifying factors identified. (11 Apr 2019 19:57)      Allergies    No Known Allergies    Intolerances      I&O's Summary      FAMILY HISTORY: non contributory        SOCIAL:    Marital Status:  (   )    (   ) Single    (   )    (  )   Occupation:   Lives with: (  ) alone  (x  ) children   (  ) spouse   (  ) parents  (  ) other  Recent Travel:   Substance Use (street drugs): ( x ) never used  (  ) other:  Tobacco Usage:  (   ) never smoked   ( x  ) former smoker   (   ) current smoker  (     ) pack year  Alcohol Usage:        HEALTH ISSUES - PROBLEM Dx:  Medication management: Medication management  Hypoalbuminemia due to protein-calorie malnutrition: Hypoalbuminemia due to protein-calorie malnutrition  Contusion of back: Contusion of back  COPD (chronic obstructive pulmonary disease): COPD (chronic obstructive pulmonary disease)  Lung cancer: Lung cancer  Frequent falls: Frequent falls  Dizziness: Dizziness          Vital Signs Last 24 Hrs  T(C): 36.1 (15 Apr 2019 06:05), Max: 36.4 (14 Apr 2019 14:06)  T(F): 97 (15 Apr 2019 06:05), Max: 97.6 (14 Apr 2019 22:09)  HR: 66 (15 Apr 2019 06:05) (66 - 69)  BP: 135/64 (15 Apr 2019 06:05) (121/54 - 175/74)  BP(mean): --  RR: 16 (15 Apr 2019 06:05) (16 - 16)                                  12.5   10.12 )-----------( 209      ( 14 Apr 2019 08:26 )             38.6       04-14    139  |  101  |  14  ----------------------------<  168<H>  4.1   |  28  |  0.9    Ca    8.3<L>      14 Apr 2019 08:26      CAPILLARY BLOOD GLUCOSE        Radiology: Radiology personally reviewed. CT shows sever COPD and JERI PET +    MEDICATIONS  (STANDING):  ALBUTerol/ipratropium for Nebulization 3 milliLiter(s) Nebulizer every 8 hours  aspirin  chewable 81 milliGRAM(s) Oral daily  atorvastatin 10 milliGRAM(s) Oral at bedtime  buDESOnide   0.5 milliGRAM(s) Respule 0.5 milliGRAM(s) Inhalation every 12 hours  difluprednate 0.05% Ophthalmic Emulsion 1 Drop(s) Left EYE two times a day  docusate sodium 200 milliGRAM(s) Oral at bedtime  escitalopram 20 milliGRAM(s) Oral daily  ferrous    sulfate 325 milliGRAM(s) Oral daily  furosemide    Tablet 20 milliGRAM(s) Oral daily  heparin  Injectable 5000 Unit(s) SubCutaneous every 12 hours  loteprednol 0.2% Ophthalmic Suspension 1 Drop(s) Both EYES daily  nicotine - 21 mG/24Hr(s) Patch 1 patch Transdermal daily  olopatadine 0.1% Ophthalmic Solution 1 Drop(s) Both EYES daily  oxybutynin 10 milliGRAM(s) Oral two times a day  predniSONE   Tablet 15 milliGRAM(s) Oral daily  QUEtiapine 25 milliGRAM(s) Oral at bedtime  roflumilast 500 MICROGram(s) Oral daily  tamsulosin 0.4 milliGRAM(s) Oral at bedtime  traZODone 50 milliGRAM(s) Oral at bedtime    MEDICATIONS  (PRN):  acetaminophen   Tablet .. 650 milliGRAM(s) Oral every 6 hours PRN Temp greater or equal to 38C (100.4F), Mild Pain (1 - 3), Moderate Pain (4 - 6)  ALPRAZolam 0.5 milliGRAM(s) Oral daily PRN anxiety      Prescriptions:        REVIEW OF SYSTEMS:    Review of Systems:  Unable to obtain due to patient's condition.      PHYSICAL EXAM:   · CONSTITUTIONAL: Well appearing, well nourished, NAD  · ENMT: Airway patent, Nasal mucosa clear. Mouth with normal mucosa. Throat has no vesicles, no oropharyngeal exudates and uvula is midline.  · EYES: Clear bilaterally, pupils equal, round and reactive to light.  · CARDIAC: Normal rate, regular rhythm.  Heart sounds S1, S2.  No murmurs, rubs or gallops.  · RESPIRATORY: b/l diffuse  wheezing  · GASTROINTESTINAL: Abdomen soft, non-tender, no guarding.  · MUSCULOSKELETAL: Spine appears normal, range of motion is not limited, no muscle or joint tenderness  · NEUROLOGICAL: Alert and confused  · SKIN: Skin normal color for race, warm, dry and intact. No evidence of rash.  · PSYCHIATRIC: Alert and appears confused  · HEME LYMPH: No adenopathy or splenomegaly. No cervical or inguinal lymphadenopathy.

## 2019-04-15 NOTE — DISCHARGE NOTE PROVIDER - CARE PROVIDER_API CALL
August Gregory (DO)  Critical Care Medicine; Pulmonary Disease; Sleep Medicine  [address] [place] [address], [address] [place] [address]  Phone: [phone]  Fax: [phone]  Follow Up Time: 2 weeks    PCP,   follow up with PCP at rehab  Phone: (   )    -  Fax: (   )    -  Follow Up Time: 1 week

## 2019-04-15 NOTE — DISCHARGE NOTE PROVIDER - PROVIDER TOKENS
PROVIDER:[TOKEN:[[id number]],FOLLOWUP:[2 weeks]],FREE:[LAST:[PCP],PHONE:[(   )    -],FAX:[(   )    -],ADDRESS:[follow up with PCP at rehab],FOLLOWUP:[1 week]]

## 2019-04-15 NOTE — DISCHARGE NOTE PROVIDER - NSFOLLOWUPCLINICS_GEN_ALL_ED_FT
[facility]  Ambulatory Care  [address] [place] [id number]  Phone: [phone]  Fax:   Follow Up Time:

## 2019-04-15 NOTE — DISCHARGE NOTE NURSING/CASE MANAGEMENT/SOCIAL WORK - NSDCDPATPORTLINK_GEN_ALL_CORE
You can access the [facility] Patient Portal, offered by [facility], by registering with the following website: [email or web address]

## 2019-04-15 NOTE — DISCHARGE NOTE PROVIDER - NSDCCPCAREPLAN_GEN_ALL_CORE_FT
PRINCIPAL DISCHARGE DIAGNOSIS  Diagnosis: Dizziness  Assessment and Plan of Treatment: Resolved      SECONDARY DISCHARGE DIAGNOSES  Diagnosis: Contusion of back  Assessment and Plan of Treatment: Resolving    Diagnosis: Lower back pain  Assessment and Plan of Treatment:     Diagnosis: Frequent falls  Assessment and Plan of Treatment: Etiology unspecified

## 2019-04-15 NOTE — PROGRESS NOTE ADULT - SUBJECTIVE AND OBJECTIVE BOX
Progress Note:  Provider Speciality                            Hospitalist      TRENT DANIELSON [id number] 87y Male     CHIEF PRESENTING COMPLAINT:  Patient is a 87y old  Male who presents with a chief complaint of Falls with dizziness (15 Apr 2019 10:18)        SUBJECTIVE:  Patient was seen and examined at bedside. Reports dyspnea in AM rounds  . No significant overnight events reported.     HISTORY OF PRESENTING ILLNESS:  HPI:  (Family at bedside to assist with history) 88yo male with several chronic conditions including COPD and lung cancer is sent to the ER due to multiple falls over the course of 1 week associated with weakness and unsteady gait. Currently no pain. No modifying factors identified. (11 Apr 2019 19:57)      PAST MEDICAL & SURGICAL HISTORY:  PAST MEDICAL & SURGICAL HISTORY:  Blind one eye  Abdominal aneurysm  Lung cancer  ESRD (end stage renal disease)  COPD (chronic obstructive pulmonary disease)  No significant past surgical history      VITAL SIGNS:  Vital Signs Last 24 Hrs  T(C): 36.1 (15 Apr 2019 06:05), Max: 36.4 (14 Apr 2019 14:06)  T(F): 97 (15 Apr 2019 06:05), Max: 97.6 (14 Apr 2019 22:09)  HR: 66 (15 Apr 2019 06:05) (66 - 69)  BP: 135/64 (15 Apr 2019 06:05) (121/54 - 175/74)  BP(mean): --  RR: 16 (15 Apr 2019 06:05) (16 - 16)  SpO2: --    PHYSICAL EXAMINATION:    Not in acute distress, Hebrew speaking  General: No pallor, or icterus, afebrile  HEENT:  L eye blindness,  EOMI, no JVD, no Bruit.  Heart: S1+S2 audible, no murmur  Lungs: bilateral  fair air entry, no wheezing, no crepitations.  Abdomen: Soft, non-tender, non-distended , no  rigidity or guarding.  CNS: AAOx2, CN  grossly intact.  Extremities:  No edema      Back:  extensive ecchymoses, bruising      REVIEW OF SYSTEMS:  Patient denies any headache, any vision complaints, runny nose, fever, chills, sore throat. Denies chest pain,  palpitation. Denies nausea, vomiting, abdominal pain, diarrhoea, Denies urinary burning, urgency, frequency, dysuria. Denies weakness in any part of the body or numbness.   At least 10 systems were reviewed in ROS. All systems reviewed  are within normal limits except for the complaints as described in Subjective.    CONSULTS:  Consultant(s) Notes Reviewed by me.   Care Discussed with Consultants/Other Providers where required.        MEDICATIONS:  MEDICATIONS  (STANDING):  ALBUTerol/ipratropium for Nebulization 3 milliLiter(s) Nebulizer every 8 hours  aspirin  chewable 81 milliGRAM(s) Oral daily  atorvastatin 10 milliGRAM(s) Oral at bedtime  buDESOnide   0.5 milliGRAM(s) Respule 0.5 milliGRAM(s) Inhalation every 12 hours  difluprednate 0.05% Ophthalmic Emulsion 1 Drop(s) Left EYE two times a day  docusate sodium 200 milliGRAM(s) Oral at bedtime  escitalopram 20 milliGRAM(s) Oral daily  ferrous    sulfate 325 milliGRAM(s) Oral daily  furosemide    Tablet 20 milliGRAM(s) Oral daily  heparin  Injectable 5000 Unit(s) SubCutaneous every 12 hours  loteprednol 0.2% Ophthalmic Suspension 1 Drop(s) Both EYES daily  nicotine - 21 mG/24Hr(s) Patch 1 patch Transdermal daily  olopatadine 0.1% Ophthalmic Solution 1 Drop(s) Both EYES daily  oxybutynin 10 milliGRAM(s) Oral two times a day  predniSONE   Tablet 40 milliGRAM(s) Oral daily  QUEtiapine 25 milliGRAM(s) Oral at bedtime  roflumilast 500 MICROGram(s) Oral daily  tamsulosin 0.4 milliGRAM(s) Oral at bedtime  traZODone 50 milliGRAM(s) Oral at bedtime    MEDICATIONS  (PRN):  acetaminophen   Tablet .. 650 milliGRAM(s) Oral every 6 hours PRN Temp greater or equal to 38C (100.4F), Mild Pain (1 - 3), Moderate Pain (4 - 6)  ALPRAZolam 0.5 milliGRAM(s) Oral daily PRN anxiety      LABOROTORY DATA/MICROBIOLOGY/I & O's:                        12.5   10.12 )-----------( 209      ( 14 Apr 2019 08:26 )             38.6     04-14    139  |  101  |  14  ----------------------------<  168<H>  4.1   |  28  |  0.9    Ca    8.3<L>      14 Apr 2019 08:26          CAPILLARY BLOOD GLUCOSE                            ASSESSMENT:    88yo male with several chronic conditions including COPD and lung cancer is sent to the ER due to multiple falls over the course of 1 week associated with weakness and unsteady     gait      ASSESSMENT:  Principal Diagnosis:  Frequent falls  Hypocalcemia secondary to hypoalbuminemia  0.9 cm left upper lobe nodule with spiculation, suspicious for neoplasm   Nicotine dependence  Chronic obstructive pulmonary disease -mild exacerbation       Associated Active Comorbid Conditions:  Dyslipidemia   Benign Prostatic Hypertrophy   history of lung CA , status post radiotherapy    PLAN:   D/c tele   status post fall- no evidence for fracture  Chronic obstructive pulmonary disease -stable , Duonebs as needed   Tapering dose of prednisone  Daliresp  along with budesonide  Dyslipidemia - continue with statins  Physical Therapy/Physiatry consult to be requested.   Depression/WERNER: continue with escitalopram and Seroquel  Coronary artery disease- continue with aspirin and metoprolol  history of Lung CA, status post radiotherapy- .9 cm left upper lobe nodule with spiculation, suspicious for neoplasm - pulmonary consult  0.9 cm left upper lobe nodule with spiculation, suspicious for neoplasm - spoke to patient  and HCP. seen by pulmonology . Not in favor of Aggressive measures.    #Progress Note Handoff  Pending : Goals of care, need for placement  Disposition:  SNF Awaited  Goals of Care:  Spoke with HCP, Ms Jean( #[phone]) to discuss overall goals of care. Agrees for SNF placement as she is herself sick and cannot take care at home.

## 2019-04-15 NOTE — DISCHARGE NOTE PROVIDER - HOSPITAL COURSE
87 year old  male with several chronic conditions including COPD, Dyslipidemia Benign Prostatic Hypertrophy  and lung cancer is sent to the ER due to multiple falls over the     course of 1 week associated with weakness and unsteady gait. he was found to have large bruise on his back and thighs secondary to fall. A complete skeltal scan & imaging did     not show any evidene for fracture. He was found to have moderate Chronic obstructive pulmonary disease exacerbation for which he was maanged supportively. Pulmoanry was taken     onboard and their recommendations followed. He has sever hypoalbuminemia and hypocalcemia secondary to that but corrected calcium was normal. He is known to have history of Lung     CA status post radiotherapy and his CT chest done here showed 0.9 cm left upper lobe nodule with spiculation, suspicious for neoplasm . he will follow up as outpatient for that     with oncology. He was seen by PT and was considered a candidate for rehab where he will be discharged and is considered medically stable now for discharge today

## 2019-04-15 NOTE — CONSULT NOTE ADULT - ASSESSMENT
Impression:  Acute chronic COPD with exacerbation  No Impending respiratory failure  no pneumonia possible viral bronchitis      Check O2 sat on NC, record, continue O2 as necessary to maintain sats > 90%  Continue IV solumedrol  bronchodilator treatments ATC and PRN  complete course of antibiotics  OOB to chair as tolerated  GI and DVT prophylaxis  pulmonary toilet  Monitor clinically, convert to oral prednisone as clinical improvement allows  Upon D/C the patient will need ICS/LABA, PRN albuterol, finish abx, slow taper of steroids ie. start Prednisone 40 mg and taper 10 mg Q 4 days.    I would not per casi therapy for the lung nodule as any treatment would not be tolerated and treatment would not extend his life expectancy.

## 2019-04-26 DIAGNOSIS — S20.229A CONTUSION OF UNSPECIFIED BACK WALL OF THORAX, INITIAL ENCOUNTER: ICD-10-CM

## 2019-04-26 DIAGNOSIS — C34.12 MALIGNANT NEOPLASM OF UPPER LOBE, LEFT BRONCHUS OR LUNG: ICD-10-CM

## 2019-04-26 DIAGNOSIS — R29.6 REPEATED FALLS: ICD-10-CM

## 2019-04-26 DIAGNOSIS — F17.210 NICOTINE DEPENDENCE, CIGARETTES, UNCOMPLICATED: ICD-10-CM

## 2019-04-26 DIAGNOSIS — S30.0XXA CONTUSION OF LOWER BACK AND PELVIS, INITIAL ENCOUNTER: ICD-10-CM

## 2019-04-26 DIAGNOSIS — N40.0 BENIGN PROSTATIC HYPERPLASIA WITHOUT LOWER URINARY TRACT SYMPTOMS: ICD-10-CM
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
## 2019-04-26 DIAGNOSIS — E78.5 HYPERLIPIDEMIA, UNSPECIFIED: ICD-10-CM

## 2019-04-26 DIAGNOSIS — N18.6 END STAGE RENAL DISEASE: ICD-10-CM

## 2019-04-26 DIAGNOSIS — F32.9 MAJOR DEPRESSIVE DISORDER, SINGLE EPISODE, UNSPECIFIED: ICD-10-CM

## 2019-04-26 DIAGNOSIS — E46 UNSPECIFIED PROTEIN-CALORIE MALNUTRITION: ICD-10-CM

## 2019-04-26 DIAGNOSIS — E61.1 IRON DEFICIENCY: ICD-10-CM

## 2019-04-26 DIAGNOSIS — E88.09 OTHER DISORDERS OF PLASMA-PROTEIN METABOLISM, NOT ELSEWHERE CLASSIFIED: ICD-10-CM
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
## 2019-04-26 DIAGNOSIS — R26.89 OTHER ABNORMALITIES OF GAIT AND MOBILITY: ICD-10-CM

## 2019-04-26 DIAGNOSIS — F41.9 ANXIETY DISORDER, UNSPECIFIED: ICD-10-CM

## 2019-04-26 DIAGNOSIS — J20.8 ACUTE BRONCHITIS DUE TO OTHER SPECIFIED ORGANISMS: ICD-10-CM

## 2019-04-26 DIAGNOSIS — Z92.3 PERSONAL HISTORY OF IRRADIATION: ICD-10-CM

## 2019-04-26 DIAGNOSIS — E83.52 HYPERCALCEMIA: ICD-10-CM

## 2019-04-26 DIAGNOSIS — J44.1 CHRONIC OBSTRUCTIVE PULMONARY DISEASE WITH (ACUTE) EXACERBATION: ICD-10-CM

## 2019-04-26 DIAGNOSIS — R53.1 WEAKNESS: ICD-10-CM

## 2019-04-26 DIAGNOSIS — J44.0 CHRONIC OBSTRUCTIVE PULMONARY DISEASE WITH (ACUTE) LOWER RESPIRATORY INFECTION: ICD-10-CM

## 2019-04-26 DIAGNOSIS — E83.51 HYPOCALCEMIA: ICD-10-CM

## 2019-04-26 DIAGNOSIS — F29 UNSPECIFIED PSYCHOSIS NOT DUE TO A SUBSTANCE OR KNOWN PHYSIOLOGICAL CONDITION: ICD-10-CM

## 2019-04-26 DIAGNOSIS — H54.60 UNQUALIFIED VISUAL LOSS, ONE EYE, UNSPECIFIED: ICD-10-CM

## 2019-04-26 DIAGNOSIS — R42 DIZZINESS AND GIDDINESS: ICD-10-CM

## 2019-04-26 DIAGNOSIS — Z99.81 DEPENDENCE ON SUPPLEMENTAL OXYGEN: ICD-10-CM

## 2022-05-05 NOTE — ED ADULT TRIAGE NOTE - SOURCE OF INFORMATION
Patient would like a Ultra One touch glucose meter and 90 day supply of the test strips sent to pharmacy Patient

## 2024-10-11 NOTE — ED ADULT NURSE NOTE - PAIN RATING/NUMBER SCALE (0-10): ACTIVITY
Photo Preface (Leave Blank If You Do Not Want): Photographs were obtained today Detail Level: Zone 7

## 2024-12-08 NOTE — PROVIDER CONTACT NOTE (MEDICATION) - REASON
pain medication You can access the FollowMyHealth Patient Portal offered by [facility] by registering at the following website: [email or web address]. By joining [employer]’s FollowMyHealth portal, you will also be able to view your health information using other applications (apps) compatible with our system.

## 2025-01-07 NOTE — ED ADULT NURSE NOTE - NSFALLRSKOUTCOME_ED_ALL_ED
Universal Safety Interventions
Bed/Stretcher in lowest position, wheels locked, appropriate side rails in place/Call bell, personal items and telephone in reach/Instruct patient to call for assistance before getting out of bed/chair/stretcher/Non-slip footwear applied when patient is off stretcher/[place] to call system/Physically safe environment - no spills, clutter or unnecessary equipment/Purposeful proactive rounding/Room/bathroom lighting operational, light cord in reach

## 2025-05-14 NOTE — DIETITIAN INITIAL EVALUATION ADULT. - PHYSICAL APPEARANCE
05/14/25 1618   AVS Reviewed   AVS & discharge instructions reviewed with patient and/or representative? Yes   Reviewed instructions with Patient;Other (name and relationship in comment)  (wife, Kendra)   Level of Understanding Questions answered;Teach back completed;Verbalized understanding;Return demonstration       Pt has no further questions at this time.  Belongings gathered.  Pt leaving for home with wife.      BMI 22.4 (5'8, 147#). [place] score 18. Skin intact./well nourished